# Patient Record
Sex: MALE | Race: WHITE | Employment: OTHER | URBAN - METROPOLITAN AREA
[De-identification: names, ages, dates, MRNs, and addresses within clinical notes are randomized per-mention and may not be internally consistent; named-entity substitution may affect disease eponyms.]

---

## 2019-07-24 RX ORDER — DILTIAZEM HYDROCHLORIDE 120 MG/1
120 CAPSULE, EXTENDED RELEASE ORAL DAILY
COMMUNITY

## 2019-07-24 RX ORDER — METOPROLOL SUCCINATE 100 MG/1
100 TABLET, EXTENDED RELEASE ORAL
COMMUNITY

## 2019-07-24 RX ORDER — ROSUVASTATIN CALCIUM 10 MG/1
10 TABLET, COATED ORAL DAILY
COMMUNITY

## 2019-07-24 RX ORDER — GABAPENTIN 300 MG/1
300 CAPSULE ORAL AT BEDTIME
COMMUNITY

## 2019-07-24 ASSESSMENT — MIFFLIN-ST. JEOR: SCORE: 1658.04

## 2019-07-26 NOTE — PHARMACY-ADMISSION MEDICATION HISTORY
Medication reconciliation interview completed by pre-admitting nurse, reviewed by pharmacy. No further clarifications needed.     Prior to Admission medications    Medication Sig Last Dose Taking? Auth Provider   apixaban ANTICOAGULANT (ELIQUIS) 5 MG tablet Take 5 mg by mouth 2 times daily Afib  Yes Reported, Patient   diltiazem ER (DILT-XR) 120 MG 24 hr capsule Take 120 mg by mouth daily  Yes Reported, Patient   gabapentin (NEURONTIN) 300 MG capsule Take 300 mg by mouth At Bedtime  Yes Reported, Patient   ipratropium (ATROVENT) 0.03 % nasal spray Spray 1 spray into both nostrils 3 times daily   Yes Unknown, Entered By History   Loratadine (CLARITIN PO) Take 10 mg by mouth At Bedtime   Yes Reported, Patient   metoprolol succinate ER (TOPROL-XL) 100 MG 24 hr tablet Take 100 mg by mouth 2 times daily  Yes Reported, Patient   multivitamin, therapeutic with minerals (THERA-VIT-M) TABS Take 1 tablet by mouth daily  Yes Unknown, Entered By History   psyllium 0.52 G capsule Take 8 capsules by mouth daily Dose confirmed with pt  Yes Reported, Patient   ranitidine (ZANTAC) 150 MG capsule Take 150 mg by mouth 2 times daily  Yes Reported, Patient   rosuvastatin (CRESTOR) 10 MG tablet Take 10 mg by mouth daily  Yes Reported, Patient

## 2019-07-30 ENCOUNTER — HOSPITAL ENCOUNTER (OUTPATIENT)
Dept: LAB | Facility: CLINIC | Age: 69
Discharge: HOME | DRG: 483 | End: 2019-07-30
Attending: ORTHOPAEDIC SURGERY | Admitting: ORTHOPAEDIC SURGERY
Payer: MEDICARE

## 2019-07-30 DIAGNOSIS — Z01.812 PRE-OPERATIVE LABORATORY EXAMINATION: ICD-10-CM

## 2019-07-30 LAB
ABO + RH BLD: NORMAL
ABO + RH BLD: NORMAL
BLD GP AB SCN SERPL QL: NORMAL
BLOOD BANK CMNT PATIENT-IMP: NORMAL
SPECIMEN EXP DATE BLD: NORMAL

## 2019-07-30 PROCEDURE — 86850 RBC ANTIBODY SCREEN: CPT | Performed by: ORTHOPAEDIC SURGERY

## 2019-07-30 PROCEDURE — 86901 BLOOD TYPING SEROLOGIC RH(D): CPT | Performed by: ORTHOPAEDIC SURGERY

## 2019-07-30 PROCEDURE — 86900 BLOOD TYPING SEROLOGIC ABO: CPT | Performed by: ORTHOPAEDIC SURGERY

## 2019-07-30 PROCEDURE — 36415 COLL VENOUS BLD VENIPUNCTURE: CPT | Performed by: ORTHOPAEDIC SURGERY

## 2019-07-31 ENCOUNTER — HOSPITAL ENCOUNTER (INPATIENT)
Facility: CLINIC | Age: 69
LOS: 1 days | Discharge: HOME | DRG: 483 | End: 2019-08-01
Attending: ORTHOPAEDIC SURGERY | Admitting: ORTHOPAEDIC SURGERY
Payer: MEDICARE

## 2019-07-31 ENCOUNTER — ANESTHESIA (OUTPATIENT)
Dept: SURGERY | Facility: CLINIC | Age: 69
DRG: 483 | End: 2019-07-31
Payer: MEDICARE

## 2019-07-31 ENCOUNTER — ANESTHESIA EVENT (OUTPATIENT)
Dept: SURGERY | Facility: CLINIC | Age: 69
DRG: 483 | End: 2019-07-31
Payer: MEDICARE

## 2019-07-31 DIAGNOSIS — R06.6 INTRACTABLE HICCUPS: ICD-10-CM

## 2019-07-31 DIAGNOSIS — R33.9 URINARY RETENTION WITH INCOMPLETE BLADDER EMPTYING: ICD-10-CM

## 2019-07-31 DIAGNOSIS — Z96.611 S/P REVERSE TOTAL SHOULDER ARTHROPLASTY, RIGHT: Primary | ICD-10-CM

## 2019-07-31 LAB — POTASSIUM SERPL-SCNC: 4.6 MMOL/L (ref 3.4–5.3)

## 2019-07-31 PROCEDURE — 0RRJ00Z REPLACEMENT OF RIGHT SHOULDER JOINT WITH REVERSE BALL AND SOCKET SYNTHETIC SUBSTITUTE, OPEN APPROACH: ICD-10-PCS | Performed by: ORTHOPAEDIC SURGERY

## 2019-07-31 PROCEDURE — 36000063 ZZH SURGERY LEVEL 4 EA 15 ADDTL MIN: Performed by: ORTHOPAEDIC SURGERY

## 2019-07-31 PROCEDURE — C1776 JOINT DEVICE (IMPLANTABLE): HCPCS | Performed by: ORTHOPAEDIC SURGERY

## 2019-07-31 PROCEDURE — 40000306 ZZH STATISTIC PRE PROC ASSESS II: Performed by: ORTHOPAEDIC SURGERY

## 2019-07-31 PROCEDURE — 25000125 ZZHC RX 250: Performed by: NURSE ANESTHETIST, CERTIFIED REGISTERED

## 2019-07-31 PROCEDURE — 71000012 ZZH RECOVERY PHASE 1 LEVEL 1 FIRST HR: Performed by: ORTHOPAEDIC SURGERY

## 2019-07-31 PROCEDURE — 25000132 ZZH RX MED GY IP 250 OP 250 PS 637: Performed by: PHYSICIAN ASSISTANT

## 2019-07-31 PROCEDURE — 36000093 ZZH SURGERY LEVEL 4 1ST 30 MIN: Performed by: ORTHOPAEDIC SURGERY

## 2019-07-31 PROCEDURE — 25000125 ZZHC RX 250: Performed by: ORTHOPAEDIC SURGERY

## 2019-07-31 PROCEDURE — 27210794 ZZH OR GENERAL SUPPLY STERILE: Performed by: ORTHOPAEDIC SURGERY

## 2019-07-31 PROCEDURE — 37000008 ZZH ANESTHESIA TECHNICAL FEE, 1ST 30 MIN: Performed by: ORTHOPAEDIC SURGERY

## 2019-07-31 PROCEDURE — 27110028 ZZH OR GENERAL SUPPLY NON-STERILE: Performed by: ORTHOPAEDIC SURGERY

## 2019-07-31 PROCEDURE — 25800030 ZZH RX IP 258 OP 636: Performed by: ANESTHESIOLOGY

## 2019-07-31 PROCEDURE — 25000125 ZZHC RX 250

## 2019-07-31 PROCEDURE — 25000128 H RX IP 250 OP 636: Performed by: ORTHOPAEDIC SURGERY

## 2019-07-31 PROCEDURE — 25000128 H RX IP 250 OP 636: Performed by: NURSE ANESTHETIST, CERTIFIED REGISTERED

## 2019-07-31 PROCEDURE — C1713 ANCHOR/SCREW BN/BN,TIS/BN: HCPCS | Performed by: ORTHOPAEDIC SURGERY

## 2019-07-31 PROCEDURE — 25000128 H RX IP 250 OP 636: Performed by: ANESTHESIOLOGY

## 2019-07-31 PROCEDURE — 25000125 ZZHC RX 250: Performed by: ANESTHESIOLOGY

## 2019-07-31 PROCEDURE — 25800030 ZZH RX IP 258 OP 636: Performed by: PHYSICIAN ASSISTANT

## 2019-07-31 PROCEDURE — 37000009 ZZH ANESTHESIA TECHNICAL FEE, EACH ADDTL 15 MIN: Performed by: ORTHOPAEDIC SURGERY

## 2019-07-31 PROCEDURE — 84132 ASSAY OF SERUM POTASSIUM: CPT | Performed by: ANESTHESIOLOGY

## 2019-07-31 PROCEDURE — 25800030 ZZH RX IP 258 OP 636: Performed by: NURSE ANESTHETIST, CERTIFIED REGISTERED

## 2019-07-31 PROCEDURE — 36415 COLL VENOUS BLD VENIPUNCTURE: CPT | Performed by: ANESTHESIOLOGY

## 2019-07-31 PROCEDURE — 27211024 ZZHC OR SUPPLY OTHER OPNP: Performed by: ORTHOPAEDIC SURGERY

## 2019-07-31 PROCEDURE — 25800025 ZZH RX 258: Performed by: ORTHOPAEDIC SURGERY

## 2019-07-31 DEVICE — IMPLANTABLE DEVICE: Type: IMPLANTABLE DEVICE | Site: SHOULDER | Status: FUNCTIONAL

## 2019-07-31 RX ORDER — ONDANSETRON 4 MG/1
4 TABLET, ORALLY DISINTEGRATING ORAL EVERY 30 MIN PRN
Status: DISCONTINUED | OUTPATIENT
Start: 2019-07-31 | End: 2019-07-31 | Stop reason: HOSPADM

## 2019-07-31 RX ORDER — LIDOCAINE 40 MG/G
CREAM TOPICAL
Status: DISCONTINUED | OUTPATIENT
Start: 2019-07-31 | End: 2019-07-31 | Stop reason: HOSPADM

## 2019-07-31 RX ORDER — ROPIVACAINE HYDROCHLORIDE 7.5 MG/ML
INJECTION, SOLUTION EPIDURAL; PERINEURAL PRN
Status: DISCONTINUED | OUTPATIENT
Start: 2019-07-31 | End: 2019-07-31

## 2019-07-31 RX ORDER — GLYCOPYRROLATE 0.2 MG/ML
INJECTION, SOLUTION INTRAMUSCULAR; INTRAVENOUS PRN
Status: DISCONTINUED | OUTPATIENT
Start: 2019-07-31 | End: 2019-07-31

## 2019-07-31 RX ORDER — FENTANYL CITRATE 50 UG/ML
25-50 INJECTION, SOLUTION INTRAMUSCULAR; INTRAVENOUS
Status: DISCONTINUED | OUTPATIENT
Start: 2019-07-31 | End: 2019-07-31 | Stop reason: HOSPADM

## 2019-07-31 RX ORDER — PROPOFOL 10 MG/ML
INJECTION, EMULSION INTRAVENOUS PRN
Status: DISCONTINUED | OUTPATIENT
Start: 2019-07-31 | End: 2019-07-31

## 2019-07-31 RX ORDER — AMOXICILLIN 250 MG
1-2 CAPSULE ORAL 2 TIMES DAILY
Qty: 50 TABLET | Refills: 0 | Status: SHIPPED | OUTPATIENT
Start: 2019-07-31

## 2019-07-31 RX ORDER — NALOXONE HYDROCHLORIDE 0.4 MG/ML
.1-.4 INJECTION, SOLUTION INTRAMUSCULAR; INTRAVENOUS; SUBCUTANEOUS
Status: DISCONTINUED | OUTPATIENT
Start: 2019-07-31 | End: 2019-08-01 | Stop reason: HOSPADM

## 2019-07-31 RX ORDER — ONDANSETRON 4 MG/1
4-8 TABLET, ORALLY DISINTEGRATING ORAL EVERY 8 HOURS PRN
Qty: 4 TABLET | Refills: 0 | Status: SHIPPED | OUTPATIENT
Start: 2019-07-31

## 2019-07-31 RX ORDER — LABETALOL 20 MG/4 ML (5 MG/ML) INTRAVENOUS SYRINGE
10
Status: DISCONTINUED | OUTPATIENT
Start: 2019-07-31 | End: 2019-07-31 | Stop reason: HOSPADM

## 2019-07-31 RX ORDER — IPRATROPIUM BROMIDE 21 UG/1
1 SPRAY, METERED NASAL 3 TIMES DAILY
Status: DISCONTINUED | OUTPATIENT
Start: 2019-07-31 | End: 2019-08-01 | Stop reason: HOSPADM

## 2019-07-31 RX ORDER — ONDANSETRON 2 MG/ML
INJECTION INTRAMUSCULAR; INTRAVENOUS PRN
Status: DISCONTINUED | OUTPATIENT
Start: 2019-07-31 | End: 2019-07-31

## 2019-07-31 RX ORDER — ONDANSETRON 2 MG/ML
4 INJECTION INTRAMUSCULAR; INTRAVENOUS EVERY 30 MIN PRN
Status: DISCONTINUED | OUTPATIENT
Start: 2019-07-31 | End: 2019-07-31 | Stop reason: HOSPADM

## 2019-07-31 RX ORDER — METOPROLOL SUCCINATE 100 MG/1
100 TABLET, EXTENDED RELEASE ORAL
Status: DISCONTINUED | OUTPATIENT
Start: 2019-07-31 | End: 2019-08-01 | Stop reason: HOSPADM

## 2019-07-31 RX ORDER — DILTIAZEM HYDROCHLORIDE 120 MG/1
120 CAPSULE, COATED, EXTENDED RELEASE ORAL DAILY
Status: DISCONTINUED | OUTPATIENT
Start: 2019-08-01 | End: 2019-08-01 | Stop reason: HOSPADM

## 2019-07-31 RX ORDER — LIDOCAINE HCL/EPINEPHRINE/PF 2%-1:200K
VIAL (ML) INJECTION PRN
Status: DISCONTINUED | OUTPATIENT
Start: 2019-07-31 | End: 2019-07-31

## 2019-07-31 RX ORDER — DEXAMETHASONE SODIUM PHOSPHATE 4 MG/ML
INJECTION, SOLUTION INTRA-ARTICULAR; INTRALESIONAL; INTRAMUSCULAR; INTRAVENOUS; SOFT TISSUE PRN
Status: DISCONTINUED | OUTPATIENT
Start: 2019-07-31 | End: 2019-07-31

## 2019-07-31 RX ORDER — SODIUM CHLORIDE, SODIUM LACTATE, POTASSIUM CHLORIDE, CALCIUM CHLORIDE 600; 310; 30; 20 MG/100ML; MG/100ML; MG/100ML; MG/100ML
INJECTION, SOLUTION INTRAVENOUS CONTINUOUS
Status: DISCONTINUED | OUTPATIENT
Start: 2019-07-31 | End: 2019-07-31 | Stop reason: HOSPADM

## 2019-07-31 RX ORDER — NALOXONE HYDROCHLORIDE 0.4 MG/ML
.1-.4 INJECTION, SOLUTION INTRAMUSCULAR; INTRAVENOUS; SUBCUTANEOUS
Status: DISCONTINUED | OUTPATIENT
Start: 2019-07-31 | End: 2019-07-31

## 2019-07-31 RX ORDER — EPHEDRINE SULFATE 50 MG/ML
INJECTION, SOLUTION INTRAMUSCULAR; INTRAVENOUS; SUBCUTANEOUS PRN
Status: DISCONTINUED | OUTPATIENT
Start: 2019-07-31 | End: 2019-07-31

## 2019-07-31 RX ORDER — HYDROMORPHONE HYDROCHLORIDE 1 MG/ML
.3-.5 INJECTION, SOLUTION INTRAMUSCULAR; INTRAVENOUS; SUBCUTANEOUS EVERY 5 MIN PRN
Status: DISCONTINUED | OUTPATIENT
Start: 2019-07-31 | End: 2019-07-31 | Stop reason: HOSPADM

## 2019-07-31 RX ORDER — LIDOCAINE HYDROCHLORIDE 10 MG/ML
INJECTION, SOLUTION INFILTRATION; PERINEURAL PRN
Status: DISCONTINUED | OUTPATIENT
Start: 2019-07-31 | End: 2019-07-31

## 2019-07-31 RX ORDER — LIDOCAINE 40 MG/G
CREAM TOPICAL
Status: DISCONTINUED | OUTPATIENT
Start: 2019-07-31 | End: 2019-08-01 | Stop reason: HOSPADM

## 2019-07-31 RX ORDER — LIDOCAINE HYDROCHLORIDE ANHYDROUS AND EPINEPHRINE 10; 10 MG/ML; UG/ML
30 INJECTION, SOLUTION INFILTRATION ONCE
Status: DISCONTINUED | OUTPATIENT
Start: 2019-07-31 | End: 2019-08-01 | Stop reason: HOSPADM

## 2019-07-31 RX ORDER — OXYCODONE HYDROCHLORIDE 5 MG/1
5-10 TABLET ORAL
Status: DISCONTINUED | OUTPATIENT
Start: 2019-07-31 | End: 2019-08-01 | Stop reason: HOSPADM

## 2019-07-31 RX ORDER — SODIUM CHLORIDE, SODIUM LACTATE, POTASSIUM CHLORIDE, CALCIUM CHLORIDE 600; 310; 30; 20 MG/100ML; MG/100ML; MG/100ML; MG/100ML
INJECTION, SOLUTION INTRAVENOUS CONTINUOUS
Status: DISCONTINUED | OUTPATIENT
Start: 2019-07-31 | End: 2019-08-01 | Stop reason: HOSPADM

## 2019-07-31 RX ORDER — ONDANSETRON 2 MG/ML
4 INJECTION INTRAMUSCULAR; INTRAVENOUS EVERY 6 HOURS PRN
Status: DISCONTINUED | OUTPATIENT
Start: 2019-07-31 | End: 2019-08-01 | Stop reason: HOSPADM

## 2019-07-31 RX ORDER — LORATADINE 10 MG/1
10 TABLET ORAL AT BEDTIME
Status: DISCONTINUED | OUTPATIENT
Start: 2019-07-31 | End: 2019-08-01 | Stop reason: HOSPADM

## 2019-07-31 RX ORDER — ROSUVASTATIN CALCIUM 5 MG/1
10 TABLET, COATED ORAL EVERY EVENING
Status: DISCONTINUED | OUTPATIENT
Start: 2019-07-31 | End: 2019-08-01 | Stop reason: HOSPADM

## 2019-07-31 RX ORDER — MULTIPLE VITAMINS W/ MINERALS TAB 9MG-400MCG
1 TAB ORAL DAILY
Status: DISCONTINUED | OUTPATIENT
Start: 2019-08-01 | End: 2019-08-01 | Stop reason: HOSPADM

## 2019-07-31 RX ORDER — LIDOCAINE HYDROCHLORIDE 20 MG/ML
JELLY TOPICAL
Status: COMPLETED
Start: 2019-07-31 | End: 2019-07-31

## 2019-07-31 RX ORDER — HYDROXYZINE HYDROCHLORIDE 25 MG/1
25 TABLET, FILM COATED ORAL EVERY 6 HOURS PRN
Status: DISCONTINUED | OUTPATIENT
Start: 2019-07-31 | End: 2019-08-01 | Stop reason: HOSPADM

## 2019-07-31 RX ORDER — ACETAMINOPHEN 325 MG/1
650 TABLET ORAL EVERY 4 HOURS PRN
Qty: 60 TABLET | Refills: 0 | Status: SHIPPED | OUTPATIENT
Start: 2019-07-31

## 2019-07-31 RX ORDER — FENTANYL CITRATE 50 UG/ML
INJECTION, SOLUTION INTRAMUSCULAR; INTRAVENOUS PRN
Status: DISCONTINUED | OUTPATIENT
Start: 2019-07-31 | End: 2019-07-31

## 2019-07-31 RX ORDER — CEFAZOLIN SODIUM 1 G/3ML
1 INJECTION, POWDER, FOR SOLUTION INTRAMUSCULAR; INTRAVENOUS SEE ADMIN INSTRUCTIONS
Status: DISCONTINUED | OUTPATIENT
Start: 2019-07-31 | End: 2019-07-31 | Stop reason: HOSPADM

## 2019-07-31 RX ORDER — PROPOFOL 10 MG/ML
INJECTION, EMULSION INTRAVENOUS CONTINUOUS PRN
Status: DISCONTINUED | OUTPATIENT
Start: 2019-07-31 | End: 2019-07-31

## 2019-07-31 RX ORDER — OXYCODONE HYDROCHLORIDE 5 MG/1
5-10 TABLET ORAL
Qty: 50 TABLET | Refills: 0 | Status: SHIPPED | OUTPATIENT
Start: 2019-07-31

## 2019-07-31 RX ORDER — CEFAZOLIN SODIUM 2 G/100ML
2 INJECTION, SOLUTION INTRAVENOUS
Status: COMPLETED | OUTPATIENT
Start: 2019-07-31 | End: 2019-07-31

## 2019-07-31 RX ORDER — HYDROMORPHONE HYDROCHLORIDE 1 MG/ML
0.2 INJECTION, SOLUTION INTRAMUSCULAR; INTRAVENOUS; SUBCUTANEOUS
Status: DISCONTINUED | OUTPATIENT
Start: 2019-07-31 | End: 2019-08-01 | Stop reason: HOSPADM

## 2019-07-31 RX ORDER — ONDANSETRON 4 MG/1
4 TABLET, ORALLY DISINTEGRATING ORAL EVERY 6 HOURS PRN
Status: DISCONTINUED | OUTPATIENT
Start: 2019-07-31 | End: 2019-08-01 | Stop reason: HOSPADM

## 2019-07-31 RX ORDER — IBUPROFEN 600 MG/1
600 TABLET, FILM COATED ORAL EVERY 6 HOURS PRN
Qty: 30 TABLET | Refills: 0 | Status: SHIPPED | OUTPATIENT
Start: 2019-07-31

## 2019-07-31 RX ORDER — GABAPENTIN 300 MG/1
300 CAPSULE ORAL AT BEDTIME
Status: DISCONTINUED | OUTPATIENT
Start: 2019-07-31 | End: 2019-08-01 | Stop reason: HOSPADM

## 2019-07-31 RX ADMIN — IPRATROPIUM BROMIDE 1 SPRAY: 21 SPRAY NASAL at 22:40

## 2019-07-31 RX ADMIN — LIDOCAINE HYDROCHLORIDE: 20 JELLY TOPICAL at 22:40

## 2019-07-31 RX ADMIN — PROPOFOL 150 MG: 10 INJECTION, EMULSION INTRAVENOUS at 14:13

## 2019-07-31 RX ADMIN — Medication 1 G: at 14:20

## 2019-07-31 RX ADMIN — MIDAZOLAM 1 MG: 1 INJECTION INTRAMUSCULAR; INTRAVENOUS at 16:14

## 2019-07-31 RX ADMIN — MIDAZOLAM 1 MG: 1 INJECTION INTRAMUSCULAR; INTRAVENOUS at 14:04

## 2019-07-31 RX ADMIN — SODIUM CHLORIDE, POTASSIUM CHLORIDE, SODIUM LACTATE AND CALCIUM CHLORIDE: 600; 310; 30; 20 INJECTION, SOLUTION INTRAVENOUS at 15:43

## 2019-07-31 RX ADMIN — Medication 10 MG: at 14:27

## 2019-07-31 RX ADMIN — PROPOFOL 50 MCG/KG/MIN: 10 INJECTION, EMULSION INTRAVENOUS at 14:24

## 2019-07-31 RX ADMIN — LIDOCAINE HYDROCHLORIDE 30 MG: 10 INJECTION, SOLUTION INFILTRATION; PERINEURAL at 14:13

## 2019-07-31 RX ADMIN — GABAPENTIN 300 MG: 300 CAPSULE ORAL at 22:39

## 2019-07-31 RX ADMIN — RANITIDINE 150 MG: 150 TABLET ORAL at 20:27

## 2019-07-31 RX ADMIN — LORATADINE 10 MG: 10 TABLET ORAL at 22:39

## 2019-07-31 RX ADMIN — SODIUM CHLORIDE, POTASSIUM CHLORIDE, SODIUM LACTATE AND CALCIUM CHLORIDE: 600; 310; 30; 20 INJECTION, SOLUTION INTRAVENOUS at 12:52

## 2019-07-31 RX ADMIN — LIDOCAINE HYDROCHLORIDE,EPINEPHRINE BITARTRATE 6 ML: 20; .005 INJECTION, SOLUTION EPIDURAL; INFILTRATION; INTRACAUDAL; PERINEURAL at 13:18

## 2019-07-31 RX ADMIN — DEXAMETHASONE SODIUM PHOSPHATE 4 MG: 4 INJECTION, SOLUTION INTRA-ARTICULAR; INTRALESIONAL; INTRAMUSCULAR; INTRAVENOUS; SOFT TISSUE at 14:13

## 2019-07-31 RX ADMIN — ROSUVASTATIN CALCIUM 10 MG: 5 TABLET, FILM COATED ORAL at 22:39

## 2019-07-31 RX ADMIN — ROCURONIUM BROMIDE 40 MG: 10 INJECTION INTRAVENOUS at 14:13

## 2019-07-31 RX ADMIN — ONDANSETRON 4 MG: 2 INJECTION INTRAMUSCULAR; INTRAVENOUS at 15:41

## 2019-07-31 RX ADMIN — FENTANYL CITRATE 150 MCG: 50 INJECTION, SOLUTION INTRAMUSCULAR; INTRAVENOUS at 14:13

## 2019-07-31 RX ADMIN — GLYCOPYRROLATE 0.2 MG: 0.2 INJECTION, SOLUTION INTRAMUSCULAR; INTRAVENOUS at 14:13

## 2019-07-31 RX ADMIN — PHENYLEPHRINE HYDROCHLORIDE 100 MCG: 10 INJECTION INTRAVENOUS at 14:35

## 2019-07-31 RX ADMIN — CEFAZOLIN SODIUM 2 G: 2 INJECTION, SOLUTION INTRAVENOUS at 14:17

## 2019-07-31 RX ADMIN — Medication 1 G: at 16:35

## 2019-07-31 RX ADMIN — Medication 10 MG: at 14:46

## 2019-07-31 RX ADMIN — ROPIVACAINE HYDROCHLORIDE 14 ML: 7.5 INJECTION, SOLUTION EPIDURAL; PERINEURAL at 13:18

## 2019-07-31 RX ADMIN — FENTANYL CITRATE 50 MCG: 50 INJECTION, SOLUTION INTRAMUSCULAR; INTRAVENOUS at 16:14

## 2019-07-31 RX ADMIN — PROPOFOL 30 MG: 10 INJECTION, EMULSION INTRAVENOUS at 16:14

## 2019-07-31 RX ADMIN — SODIUM CHLORIDE, POTASSIUM CHLORIDE, SODIUM LACTATE AND CALCIUM CHLORIDE: 600; 310; 30; 20 INJECTION, SOLUTION INTRAVENOUS at 18:37

## 2019-07-31 RX ADMIN — CEFAZOLIN 1 G: 1 INJECTION, POWDER, FOR SOLUTION INTRAMUSCULAR; INTRAVENOUS at 16:14

## 2019-07-31 ASSESSMENT — ENCOUNTER SYMPTOMS
SEIZURES: 0
DYSRHYTHMIAS: 1

## 2019-07-31 ASSESSMENT — MIFFLIN-ST. JEOR: SCORE: 1635.36

## 2019-07-31 NOTE — ANESTHESIA POSTPROCEDURE EVALUATION
Patient: Tanner Pickett    Procedure(s):  Right reverse total shoulder arthroplasty    Diagnosis:right shoulder end stage osteoarthritis  Diagnosis Additional Information: No value filed.    Anesthesia Type:  General, ETT, Periph. Nerve Block for postop pain    Note:  Anesthesia Post Evaluation    Patient location during evaluation: PACU  Patient participation: Able to participate in evaluation but full recovery from regional anesthesia has not yet ocurrred but is anticipated to occur within 48 hours  Level of consciousness: awake  Pain management: adequate  Airway patency: patent  Cardiovascular status: acceptable  Respiratory status: acceptable  Hydration status: acceptable  PONV: controlled     Anesthetic complications: None          Last vitals:  Vitals:    07/31/19 1700 07/31/19 1705 07/31/19 1710   BP: 123/61 127/75    Pulse: 53 56    Resp: 8 16 12   Temp:      SpO2: 100% 100% 100%         Electronically Signed By: Erasto Oliva MD  July 31, 2019  5:18 PM

## 2019-07-31 NOTE — ANESTHESIA CARE TRANSFER NOTE
Patient: Tanner Pickett    Procedure(s):  Right reverse total shoulder arthroplasty    Diagnosis: right shoulder end stage osteoarthritis  Diagnosis Additional Information: No value filed.    Anesthesia Type:   General, ETT, Periph. Nerve Block for postop pain     Note:  Airway :Face Mask  Patient transferred to:PACU  Handoff Report: Identifed the Patient, Identified the Reponsible Provider, Reviewed the pertinent medical history, Discussed the surgical course, Reviewed Intra-OP anesthesia mangement and issues during anesthesia, Set expectations for post-procedure period and Allowed opportunity for questions and acknowledgement of understanding      Vitals: (Last set prior to Anesthesia Care Transfer)    CRNA VITALS  7/31/2019 1608 - 7/31/2019 1647      7/31/2019             NIBP:  107/49    NIBP Mean:  73                Electronically Signed By: INOCENCIA Blevins CRNA  July 31, 2019  4:47 PM

## 2019-07-31 NOTE — ANESTHESIA PROCEDURE NOTES
Peripheral nerve/Neuraxial procedure note : Brachial plexus  Pre-Procedure  Performed by Allan Pimentle MD  Referred by ROMEO  Location: pre-op      Pre-Anesthestic Checklist: patient identified, IV checked, site marked, risks and benefits discussed, informed consent, monitors and equipment checked, pre-op evaluation, at physician/surgeon's request and post-op pain management    Timeout  Correct Patient: Yes   Correct Procedure: Yes   Correct Site: Yes   Correct Laterality: Yes   Correct Position: Yes   Site Marked: Yes   .   Procedure Documentation    .    Procedure:  right  Brachial plexus.     Ultrasound used to identify targeted nerve, plexus, or vascular marker and placed a needle adjacent to it., Ultrasound was used to visualize the spread of the anesthetic in close proximity to the above stated nerve.   Patient Prep;mask, sterile gloves, povidone-iodine 7.5% surgical scrub.  Nerve Stim: Initial Level 0.7 mA. Lowest motor response mA..  Needle: insulated, short bevel Needle Gauge: 22.    Needle Length (Inches) 4  Insertion Method: Single Shot.       Assessment/Narrative  Paresthesias: No.  Injection made incrementally with aspirations every 5 mL..  The placement was negative for: blood aspirated, painful injection and site bleeding.  Bolus given via needle..   Secured via.   Complications: none. Test dose of mL at. Test dose negative for signs of intravascular, subdural or intrathecal injection. Comments:  The surgeon has given a verbal order transferring care of this patient to me for the performance of a regional analgesia block for post-op pain control. It is requested of me because I am uniquely trained and qualified to perform this block and the surgeon is neither trained nor qualified to perform this procedure.

## 2019-07-31 NOTE — BRIEF OP NOTE
[facility]    Brief Operative Note    Pre-operative diagnosis: right shoulder end stage osteoarthritis  Post-operative diagnosis same  Procedure: Procedure(s):  Right reverse total shoulder arthroplasty  Surgeon: Surgeon(s) and Role:     * Per Yepez MD - Primary  Anesthesia: Other   Estimated blood loss: * No values recorded between 7/31/2019  2:30 PM and 7/31/2019  4:35 PM *  Drains: None  Specimens: * No specimens in log *  Findings:   None.  Complications: None.  Implants:    Implant Name Type Inv. Item Serial No.  Lot No. LRB No. Used   large augmented baseplatewith mini taper adapter porous plasma uncemented    [employer] [id number] Right 1   central screw 6.5mm 35mm length    [employer] [id number] Right 1   fixed locking screw 4.75 35mm length    [employer] [id number] Right 1   fixed locking screw 4.75mm 15mm length    [employer] [id number] Right 1   fixed locking screw 4.75mm 20mm length    [employer] [id number] Right 1   fixed locking screw 4.75mm 20mm length    [employer] [id number] Right 1   glenosphere standard offset 40mm diameter  devin: [id number]    BIOMET [id number] Right 1   mini humeral stem 10mm 83mm long    BIOMET [id number] Right 1   mini humeral tray standard thickness +3mm taper offset  40mm  diameter    [employer]ET [id number] Right 1   highly crosslinked  polyethylene bearing standard 40mm diameter    [employer] [id number] Right 1   augmented reamer guide screw    [employer] [id number] Right 1      -Sling all times besides hygiene  F/U in clinic in 2 weeks

## 2019-07-31 NOTE — ANESTHESIA PREPROCEDURE EVALUATION
Anesthesia Pre-Procedure Evaluation    Patient: Tanner Pickett   MRN: [id number] : 1950          Preoperative Diagnosis: right shoulder end stage osteoarthritis    Procedure(s):  Right reverse total shoulder arthroplasty (choice anesthesia)    Past Medical History:   Diagnosis Date     Arrhythmia     Hx of Afib     Clavicle fracture      Constipation      High cholesterol      History of blood transfusion      Hypertension      Multiple rib fractures      Osteoarthritis     generalized     Pneumothorax      Shoulder fracture      Past Surgical History:   Procedure Laterality Date     ESOPHAGOSCOPY, GASTROSCOPY, DUODENOSCOPY (EGD), COMBINED  2015    Dr. Delarosa [facility]     ESOPHAGOSCOPY, GASTROSCOPY, DUODENOSCOPY (EGD), COMBINED N/A 2015    Procedure: COMBINED ESOPHAGOSCOPY, GASTROSCOPY, DUODENOSCOPY (EGD);  Surgeon: Jennifer Delarosa MD;  Location:  GI     ESOPHAGOSCOPY, GASTROSCOPY, DUODENOSCOPY (EGD), COMBINED N/A 2016    Procedure: COMBINED ESOPHAGOSCOPY, GASTROSCOPY, DUODENOSCOPY (EGD);  Surgeon: Loida Portillo MD;  Location:  GI     HEAD & NECK SURGERY      wisdom teeth     ORTHOPEDIC SURGERY      right shoulder surgery age 25 yrs.     ORTHOPEDIC SURGERY Left 2019    great toe surgery for arthritis     Anesthesia Evaluation     .             ROS/MED HX    ENT/Pulmonary:      (-) asthma, sleep apnea and Other pulmonary disease   Neurologic:      (-) seizures, CVA, TIA, Other neuro hx, Dementia, Neuropathy and migraines   Cardiovascular:     (+) Dyslipidemia, hypertension----. : . . . :. dysrhythmias a-fib, .      (-) CAD, CHF and pulmonary hypertension   METS/Exercise Tolerance:     Hematologic:     (+) History of Transfusion -     (-) anemia   Musculoskeletal:   (+) arthritis,  -       GI/Hepatic:        (-) GERD and hepatitis   Renal/Genitourinary:      (-) renal disease   Endo:      (-) Type I DM, Type II DM, thyroid disease, chronic steroid usage, other endocrine  disorder and obesity   Psychiatric:        (-) psychiatric history   Infectious Disease:  - neg infectious disease ROS       Malignancy:      - no malignancy   Other:                          Physical Exam      Airway   Mallampati: II  TM distance: >3 FB  Neck ROM: full    Dental     Cardiovascular   Rhythm and rate: regular and normal  (-) no murmur    Pulmonary    breath sounds clear to auscultation    Other findings: Lab Test        02/03/16 01/23/16 01/22/16      --          01/22/16      --          01/21/16                       1933          0621          1807           --           0450           --           2023          WBC          4.7           --           --           --          6.1           --          6.1           HGB          9.7*         9.2*         9.4*           < >        9.0*           < >        7.8*          MCV          85            --           --           --          84            --          86            PLT          381           --           --           --          277           --          346           INR           --           --           --           --           --           --          1.08           < > = values in this interval not displayed.                  Lab Test        07/31/19 01/22/16 01/21/16 01/21/16 01/02/16                       1156          0450          2030          2023          0700          NA            --          140          135          136          140           POTASSIUM    4.6          4.1          3.6          3.6          4.2           CHLORIDE      --          110*          --          105          109           CO2           --          22            --          21           25            BUN           --          18            --          18           9             CR            --          0.86          --          1.03         1.01          ANIONGAP      --          8             --          10           6       "       AGUSTO           --          7.6*          --          7.3*         8.2*          GLC           --          117*          --          122*         106*                Lab Results   Component Value Date    WBC 4.7 02/03/2016    HGB 9.7 (L) 02/03/2016    HCT 28.7 (L) 02/03/2016     02/03/2016    .1 (H) 03/01/2014    SED 88 (H) 03/01/2014     01/22/2016    POTASSIUM 4.6 07/31/2019    CHLORIDE 110 (H) 01/22/2016    CO2 22 01/22/2016    BUN 18 01/22/2016    CR 0.86 01/22/2016     (H) 01/22/2016    AGUSTO 7.6 (L) 01/22/2016    MAG 2.0 01/01/2016    ALBUMIN 2.9 (L) 01/21/2016    PROTTOTAL 6.1 (L) 01/21/2016    ALT 27 01/21/2016    AST 19 01/21/2016    ALKPHOS 74 01/21/2016    BILITOTAL 0.3 01/21/2016    LIPASE 256 12/31/2015    PTT <20  Results confirmed by repeat test   (L) 01/21/2016    INR 1.08 01/21/2016    TSH 1.72 02/03/2016       Preop Vitals  BP Readings from Last 3 Encounters:   07/31/19 (!) 160/80   02/03/16 162/86   01/23/16 141/81    Pulse Readings from Last 3 Encounters:   02/03/16 84   01/01/16 80   03/01/14 79      Resp Readings from Last 3 Encounters:   02/03/16 16   01/23/16 16   01/02/16 16    SpO2 Readings from Last 3 Encounters:   07/31/19 100%   02/03/16 99%   01/23/16 99%      Temp Readings from Last 1 Encounters:   07/31/19 98  F (36.7  C) (Temporal)    Ht Readings from Last 1 Encounters:   07/31/19 1.803 m (5' 11\")      Wt Readings from Last 1 Encounters:   07/31/19 84.8 kg (187 lb)    Estimated body mass index is 26.08 kg/m  as calculated from the following:    Height as of this encounter: 1.803 m (5' 11\").    Weight as of this encounter: 84.8 kg (187 lb).       Anesthesia Plan      History & Physical Review  History and physical reviewed and following examination; no interval change.    ASA Status:  3 .    NPO Status:  > 8 hours    Plan for General, ETT and Periph. Nerve Block for postop pain with Propofol induction. Maintenance will be Balanced.    PONV prophylaxis:  " Ondansetron (or other 5HT-3) and Dexamethasone or Solumedrol       Postoperative Care  Postoperative pain management:  IV analgesics, Oral pain medications and Peripheral nerve block (Single Shot).      Consents                 Allan Pimentel MD                    .

## 2019-07-31 NOTE — PROVIDER NOTIFICATION
While admitting patient, he complained that his right underarm was burning.  Shavon the NST asked me to look at it.  On observation it appears there is a large area of redness that is slightly raised in areas.  It encompasses the entire axilla and surrounding skin.  The patient states he noticed it after his showers and feels that he may not have rinsed the soap off completely.  I told Dr. Pimentel about it and had him look at it.  I will also inform Dr. Yepez.

## 2019-08-01 ENCOUNTER — APPOINTMENT (OUTPATIENT)
Dept: GENERAL RADIOLOGY | Facility: CLINIC | Age: 69
DRG: 483 | End: 2019-08-01
Attending: PHYSICIAN ASSISTANT
Payer: MEDICARE

## 2019-08-01 ENCOUNTER — APPOINTMENT (OUTPATIENT)
Dept: OCCUPATIONAL THERAPY | Facility: CLINIC | Age: 69
DRG: 483 | End: 2019-08-01
Attending: ORTHOPAEDIC SURGERY
Payer: MEDICARE

## 2019-08-01 VITALS
SYSTOLIC BLOOD PRESSURE: 144 MMHG | WEIGHT: 187 LBS | TEMPERATURE: 97.9 F | DIASTOLIC BLOOD PRESSURE: 65 MMHG | HEIGHT: 71 IN | BODY MASS INDEX: 26.18 KG/M2 | HEART RATE: 47 BPM | OXYGEN SATURATION: 100 % | RESPIRATION RATE: 16 BRPM

## 2019-08-01 LAB — GLUCOSE BLDC GLUCOMTR-MCNC: 156 MG/DL (ref 70–99)

## 2019-08-01 PROCEDURE — 97535 SELF CARE MNGMENT TRAINING: CPT | Mod: GO | Performed by: STUDENT IN AN ORGANIZED HEALTH CARE EDUCATION/TRAINING PROGRAM

## 2019-08-01 PROCEDURE — 73030 X-RAY EXAM OF SHOULDER: CPT | Mod: RT

## 2019-08-01 PROCEDURE — 12000000 ZZH R&B MED SURG/OB

## 2019-08-01 PROCEDURE — 97165 OT EVAL LOW COMPLEX 30 MIN: CPT | Mod: GO | Performed by: STUDENT IN AN ORGANIZED HEALTH CARE EDUCATION/TRAINING PROGRAM

## 2019-08-01 PROCEDURE — 25000125 ZZHC RX 250: Performed by: ORTHOPAEDIC SURGERY

## 2019-08-01 PROCEDURE — 00000146 ZZHCL STATISTIC GLUCOSE BY METER IP

## 2019-08-01 PROCEDURE — 25000132 ZZH RX MED GY IP 250 OP 250 PS 637: Performed by: PHYSICIAN ASSISTANT

## 2019-08-01 PROCEDURE — 97530 THERAPEUTIC ACTIVITIES: CPT | Mod: GO | Performed by: STUDENT IN AN ORGANIZED HEALTH CARE EDUCATION/TRAINING PROGRAM

## 2019-08-01 PROCEDURE — 25000132 ZZH RX MED GY IP 250 OP 250 PS 637: Performed by: INTERNAL MEDICINE

## 2019-08-01 PROCEDURE — 99221 1ST HOSP IP/OBS SF/LOW 40: CPT | Performed by: INTERNAL MEDICINE

## 2019-08-01 PROCEDURE — 97110 THERAPEUTIC EXERCISES: CPT | Mod: GO | Performed by: STUDENT IN AN ORGANIZED HEALTH CARE EDUCATION/TRAINING PROGRAM

## 2019-08-01 PROCEDURE — 25000128 H RX IP 250 OP 636: Performed by: ORTHOPAEDIC SURGERY

## 2019-08-01 RX ORDER — CHLORPROMAZINE HYDROCHLORIDE 10 MG/1
10 TABLET, FILM COATED ORAL 3 TIMES DAILY
Qty: 3 TABLET | Refills: 0 | Status: SHIPPED | OUTPATIENT
Start: 2019-08-01

## 2019-08-01 RX ORDER — TAMSULOSIN HYDROCHLORIDE 0.4 MG/1
0.4 CAPSULE ORAL DAILY
Qty: 30 CAPSULE | Refills: 0 | Status: SHIPPED | OUTPATIENT
Start: 2019-08-01

## 2019-08-01 RX ORDER — CHLORPROMAZINE HYDROCHLORIDE 10 MG/1
10 TABLET, FILM COATED ORAL 3 TIMES DAILY
Status: DISCONTINUED | OUTPATIENT
Start: 2019-08-01 | End: 2019-08-01 | Stop reason: HOSPADM

## 2019-08-01 RX ORDER — HYDROXYZINE HYDROCHLORIDE 25 MG/1
25 TABLET, FILM COATED ORAL 3 TIMES DAILY PRN
Qty: 50 TABLET | Refills: 0 | Status: SHIPPED | OUTPATIENT
Start: 2019-08-01

## 2019-08-01 RX ORDER — METOCLOPRAMIDE 5 MG/1
10 TABLET ORAL
Status: DISCONTINUED | OUTPATIENT
Start: 2019-08-01 | End: 2019-08-01

## 2019-08-01 RX ORDER — LIDOCAINE HYDROCHLORIDE 20 MG/ML
10 JELLY TOPICAL ONCE
Status: DISCONTINUED | OUTPATIENT
Start: 2019-08-01 | End: 2019-08-01 | Stop reason: HOSPADM

## 2019-08-01 RX ORDER — TAMSULOSIN HYDROCHLORIDE 0.4 MG/1
0.4 CAPSULE ORAL DAILY
Qty: 30 CAPSULE | Refills: 0 | Status: SHIPPED | OUTPATIENT
Start: 2019-08-01 | End: 2019-08-01

## 2019-08-01 RX ORDER — TAMSULOSIN HYDROCHLORIDE 0.4 MG/1
0.4 CAPSULE ORAL DAILY
Status: DISCONTINUED | OUTPATIENT
Start: 2019-08-01 | End: 2019-08-01 | Stop reason: HOSPADM

## 2019-08-01 RX ADMIN — RANITIDINE 150 MG: 150 TABLET ORAL at 11:58

## 2019-08-01 RX ADMIN — APIXABAN 5 MG: 5 TABLET, FILM COATED ORAL at 11:58

## 2019-08-01 RX ADMIN — Medication 8 CAPSULE: at 11:56

## 2019-08-01 RX ADMIN — CHLORPROMAZINE HYDROCHLORIDE 10 MG: 10 TABLET, SUGAR COATED ORAL at 11:59

## 2019-08-01 RX ADMIN — DILTIAZEM HYDROCHLORIDE 120 MG: 120 CAPSULE, COATED, EXTENDED RELEASE ORAL at 11:58

## 2019-08-01 RX ADMIN — MULTIPLE VITAMINS W/ MINERALS TAB 1 TABLET: TAB at 11:58

## 2019-08-01 RX ADMIN — OXYCODONE HYDROCHLORIDE 5 MG: 5 TABLET ORAL at 12:45

## 2019-08-01 RX ADMIN — HYDROXYZINE HYDROCHLORIDE 25 MG: 25 TABLET ORAL at 05:37

## 2019-08-01 RX ADMIN — IPRATROPIUM BROMIDE 1 SPRAY: 21 SPRAY NASAL at 12:01

## 2019-08-01 RX ADMIN — METOPROLOL SUCCINATE 100 MG: 100 TABLET, EXTENDED RELEASE ORAL at 11:56

## 2019-08-01 RX ADMIN — OXYCODONE HYDROCHLORIDE 5 MG: 5 TABLET ORAL at 04:22

## 2019-08-01 RX ADMIN — OXYCODONE HYDROCHLORIDE 5 MG: 5 TABLET ORAL at 05:37

## 2019-08-01 RX ADMIN — TAMSULOSIN HYDROCHLORIDE 0.4 MG: 0.4 CAPSULE ORAL at 12:45

## 2019-08-01 RX ADMIN — OXYCODONE HYDROCHLORIDE 5 MG: 5 TABLET ORAL at 18:58

## 2019-08-01 ASSESSMENT — ACTIVITIES OF DAILY LIVING (ADL)
PREVIOUS_RESPONSIBILITIES: SHOPPING;DRIVING
ADLS_ACUITY_SCORE: 13
ADLS_ACUITY_SCORE: 13

## 2019-08-01 NOTE — PROGRESS NOTES
08/01/19 1010   Quick Adds   Type of Visit Initial Occupational Therapy Evaluation   Living Environment   Lives With spouse  (2 children in their 40s and 3 grandchildren in teenage yrs)   Living Arrangements house   Home Accessibility stairs to enter home;stairs within home   Number of Stairs, Main Entrance 1   Number of Stairs, Within Home, Primary 8   Transportation Anticipated family or friend will provide   Living Environment Comment pt and wife are both retired, she will be able to assist pt as needed   Self-Care   Usual Activity Tolerance good   Current Activity Tolerance moderate   Activity/Exercise/Self-Care Comment pt has a shower chair   Functional Level   Ambulation 0-->independent   Transferring 0-->independent   Toileting 0-->independent  (elevated toilet)   Bathing 0-->independent  (step in shower)   Dressing 0-->independent   Fall history within last six months no   Prior Functional Level Comment pt independent at baseline   General Information   Onset of Illness/Injury or Date of Surgery - Date 08/01/19   Referring Physician Jose Martin PAZ   Patient/Family Goals Statement return home   Additional Occupational Profile Info/Pertinent History of Current Problem POD#1 R reverse TSA; pt has a history of several foot/toe procedures, most recent earlier this year, pt had been NWB but has recovered and has no limitations   Weight-Bearing Status - RUE nonweight-bearing   General Observations pt is R handed   Cognitive Status Examination   Orientation orientation to person, place and time   Level of Consciousness alert   Follows Commands (Cognition) WNL   Cognitive Comment pt somewhat impulsive and with impaired memory at times during session   Visual Perception   Visual Perception Wears glasses  (reading glasses)   Pain Assessment   Patient Currently in Pain Yes, see Vital Sign flowsheet  (4)   Range of Motion (ROM)   ROM Comment L UE AROM intact   Strength   Strength Comments L UE grossly intact   Mobility    Bed Mobility Comments SBA, verbal cues   Transfer Skills   Transfer Comments SBA   Transfer Skill: Bed to Chair/Chair to Bed   Level of Omar: Bed to Chair stand-by assist   Physical Assist/Nonphysical Assist: Bed to Chair verbal cues   Transfer Skill: Sit to Stand   Level of Omar: Sit/Stand stand-by assist   Physical Assist/Nonphysical Assist: Sit/Stand verbal cues   Balance   Balance Comments appropriate balance throughout session   Upper Body Dressing   Level of Omar: Dress Upper Body moderate assist (50% patients effort)   Physical Assist/Nonphysical Assist: Dress Upper Body verbal cues   Lower Body Dressing   Level of Omar: Dress Lower Body minimum assist (75% patients effort)   Toileting   Level of Omar: Toilet stand-by assist   Physical Assist/Nonphysical Assist: Toilet verbal cues   Grooming   Level of Omar: Grooming stand-by assist   Physical Assist/Nonphysical Assist: Grooming verbal cues   Instrumental Activities of Daily Living (IADL)   Previous Responsibilities shopping;driving   Activities of Daily Living Analysis   Impairments Contributing to Impaired Activities of Daily Living coordination impaired;pain;post surgical precautions;strength decreased   General Therapy Interventions   Planned Therapy Interventions ADL retraining;IADL retraining;transfer training;progressive activity/exercise   Clinical Impression   Criteria for Skilled Therapeutic Interventions Met yes, treatment indicated   OT Diagnosis imaired ability to manage ADL/IADLs safely    Influenced by the following impairments post-op pain, fatigue, impaired activity tolerance,    Assessment of Occupational Performance 3-5 Performance Deficits   Identified Performance Deficits dressing, bathing, toileting, meals, driving   Clinical Decision Making (Complexity) Low complexity   Therapy Frequency Daily   Predicted Duration of Therapy Intervention (days/wks) one time eval/treat   Anticipated  "Discharge Disposition Home with Assist   Risks and Benefits of Treatment have been explained. Yes   Patient, Family & other staff in agreement with plan of care Yes   Clinical Impression Comments demonstrate ability to benefit from skilled OT services   [facility] TM \"6 Clicks\"   2016, Trustees of [facility], under license to [employer].  All rights reserved.   6 Clicks Short Forms Daily Activity Inpatient Short Form   [facility]  \"6 Clicks\" Daily Activity Inpatient Short Form   1. Putting on and taking off regular lower body clothing? 3 - A Little   2. Bathing (including washing, rinsing, drying)? 3 - A Little   3. Toileting, which includes using toilet, bedpan or urinal? 4 - None   4. Putting on and taking off regular upper body clothing? 3 - A Little   5. Taking care of personal grooming such as brushing teeth? 4 - None   6. Eating meals? 4 - None   Daily Activity Raw Score (Score out of 24.Lower scores equate to lower levels of function) 21   Total Evaluation Time   Total Evaluation Time (Minutes) 8     "

## 2019-08-01 NOTE — PROGRESS NOTES
"PRIMARY DIAGNOSIS: \"GENERIC\" NURSING  OUTPATIENT/OBSERVATION GOALS TO BE MET BEFORE DISCHARGE:  1. ADLs back to baseline: Yes    2. Activity and level of assistance: Up with standby assistance.    3. Pain status: Improved-controlled with oral pain medications.    4. Return to near baseline physical activity: Yes     Discharge Planner Nurse   Safe discharge environment identified: Yes  Barriers to discharge: No       Entered by: Miley Delaney 08/01/2019 10:52 AM     Please review provider order for any additional goals.   Nurse to notify provider when observation goals have been met and patient is ready for discharge.  "

## 2019-08-01 NOTE — PLAN OF CARE
Discharge Planner PT   Patient plan for discharge: Not known.   Current status: Per chart and discussion with OT: patient up with SBA. Evaluating OT reports no IP PT needs at this time.   Barriers to return to prior living situation: Defer to OT  Recommendations for discharge: Defer to OT  Rationale for recommendations: No IP PT needs identified. Will complete order. Please reorder if status changes.        Entered by: Juan Isaacs 08/01/2019 2:07 PM

## 2019-08-01 NOTE — OP NOTE
Procedure Date: 07/31/2019      PREOPERATIVE DIAGNOSIS:  Right shoulder end-stage osteoarthritis.      POSTOPERATIVE DIAGNOSIS:  Right shoulder end-stage osteoarthritis.      PROCEDURE:  Right reverse total shoulder arthroplasty.      SURGEON:  Per Yepez MD      ASSISTANT:  Aldo Philippe PA-C      ANESTHESIA:  General.      ESTIMATED BLOOD LOSS:  150 mL.      INTRAOPERATIVE COMPLICATIONS:  None apparent.      OPERATIVE INDICATIONS:  The patient has a long history of progressive right shoulder pain with a pseudoparalysis of the right shoulder and extensive deformity with a type B2 glenoid wear pattern.  Given the significance of his symptoms with overall impact on his quality of life, he elected to proceed with a right reverse total shoulder arthroplasty.      DESCRIPTION OF PROCEDURE:  The patient was identified in the preoperative holding area and the operative site was marked.  The consent was again reviewed and all questions were answered.  He was taken to the operating room, placed under general anesthesia, positioned in the beach chair position with the right upper extremity prepped and draped in the usual sterile fashion.  Preoperative antibiotics administered.  A timeout called to ensure the correct operative site and procedure.  He had a previous procedure and his old deltopectoral incision was opened sharply with dissection down through skin and subcutaneous tissues.  Dissection was carried down to the cephalic vein, which was retracted laterally with the deltoid and the pectoralis was retracted medially.  Subdeltoid fascia was released.  The biceps was opened and tenodesed to the upper border of the pectoralis.  The rotator interval was opened.  The lesser tuberosity osteotomy was completed.  The proximal humerus was then dislocated and the humeral head exposed.  Sequential reaming and broaching commenced up to a size 10, at which time a mini size 10 stem was broached in 30 degrees of retroversion.  A  head protector was placed and the glenoid was then exposed.  A 4-sided release of the subscapularis was performed.  There was a previous procedure done through the subscapularis and there were old Ethibond sutures and significant scarring with lack of mobility of the subscapularis and this was therefore carefully released.  The axillary nerve was palpated and protected as labrum was excised around the peripheral rim of the glenoid.  Utilizing the 3-D printed guide and referencing off the model, the guide was placed to allow for center position of the central pin.  The pin was then positioned; it was felt to match the positioning on the model.  This was positioned to 10 degrees of tilt.  Reaming commenced along the anterior margin until 50% of the cancellous bone was exposed.  After assessing the guide, the large augment was felt to be most appropriate.  The reaming guide was positioned and placed and the posterior ream was completed to allow for position of the augment.  A large augmented baseplate was therefore opened and seated in place.  The central 35 mm nonlocking screw had excellent purchase.  The superior 35 mm inferior 20, anterior 20 and posterior 15 screws were positioned in a locking fashion.  The 40 mm glenosphere was then opened and seated onto the baseplate with excellent secure purchase.  The proximal humerus was once again exposed and trialing commenced with the +3 offset humeral tray.  This showed excellent stability.  The final size 10 Biomet mini stem was opened and seated to the same level.  After trialing, the +3 mm offset humeral tray with standard polyethylene insert was felt to be most appropriate.  This was put together and the taper lock was secured.  The humeral tray was again reduced onto the glenosphere and the shoulder was taken through range of motion with excellent stability and no impingement through range of motion.  Three #5 FiberWires were placed through drill holes at the lesser  tuberosity osteotomy site.  The free ends were placed through the medial aspect of the osteotomy attached to the subscapularis.  Three rack and hitch type sutures were secured with the bone while compressed back against the site of the osteotomy.  The shoulder was once again taken through range of motion and then the repair was felt to be secure.  The wound was soaked in diluted Betadine solution followed by irrigation with pulsatile lavage.  The deltopectoral interval was closed with running #1 Vicryl suture.  The remainder of the wound was closed in layers.  Sterile dressings and a Super Sling were applied.  The patient was then awoken from general anesthesia and transferred to the postanesthesia care unit in stable condition.        Aldo Philippe PA-C was present and scrubbed throughout the case and his assistance was critical for patient positioning, assistance with prepping, draping, soft tissue retraction, arm manipulation, wound closure, dressing and sling application.         EMMANUEL WALTERS MD             D: 2019   T: 2019   MT: MELANI      Name:     NAY VIEIRA   MRN:      [id number]        Account:        [id number]   :      1950           Procedure Date: 2019      Document: [id number]

## 2019-08-01 NOTE — DISCHARGE INSTRUCTIONS
Urology General Number : [phone]    Urology [place] Location: [phone]    PLease call to make an appointment regarding your catheter removal, voiding trial, and whether or not to continue taking flomax    Patient to follow up with appointment in 2 weeks. Sling at all times besides hygiene. No active shoulder movement. Ok to come out of sling for elbow and wrist ROM but no shoulder ROM. Aquacel is water proof so ok to get wet in shower but do not submerge in water. Call if uncontrolled pain, fever >101 or excessive drainage from bandage.           Follow up with Urology clinic re: a voiding trial in 5-7 days.  Discuss whether you should continue flomax at that point.    No use of right arm. In sling at all times besides hygiene and when relaxing with no shoulder ROM.

## 2019-08-01 NOTE — PROGRESS NOTES
Patient has returned to baseline mental status: Yes, A&O  Patient vital signs are at baseline: Yes, HR matty at baseline, on RA  Patient able to ambulate as they were prior to admission or with assist devices provided by therapies during their stay: Up with SBA, RUE sling in use  Patient voiding No; required additional straight cath for 1375 mL PVR. Only able to void 100 mL independently  Patient able to tolerate oral intake: Yes  Patient has adequate pain control using Oral analgesics: Attempted oxycodone PO, pain increased to 7/10. Additional oxycodone with atarax given, will monitor

## 2019-08-01 NOTE — PROGRESS NOTES
Patient has returned to baseline mental status: Yes, A&O, at mental baseline  Patient vital signs are at baseline: VSS on RA  Patient able to ambulate as they were prior to admission or with assist devices provided by therapies during their stay: Pt up with SBA; RUE sling in use  Patient voiding: No; required straight cath and currently no urge to void. Will need to reassess overnight  Patient able to tolerate oral intake: Yes, now SL and tolerating regular diet  Patient has adequate pain control using Oral analgesics: Denies pain currently, yet to trial PO

## 2019-08-01 NOTE — PROGRESS NOTES
Orthopedic Surgery  Tanner Pickett  2019  Admit Date:  2019  POD # 1 right R TSA    Tanner Pickett is a 70 y/o male whom was rounded on 1 day s/p right reverse total shoulder arthroplasty.  Pain controlled.  Tolerating oral intake.  No events overnight. The patient denies chest pain, SOB, calf pain.    Alert and orient to person, place, and time.  Vital Sign Ranges  Temperature Temp  Av.3  F (35.7  C)  Min: 95.4  F (35.2  C)  Max: 98  F (36.7  C)   Blood pressure Systolic (24hrs), Av , Min:113 , Max:165        Diastolic (24hrs), Av, Min:59, Max:84      Pulse Pulse  Av.3  Min: 47  Max: 69   Respirations Resp  Av.3  Min: 8  Max: 22   Pulse oximetry SpO2  Av.9 %  Min: 86 %  Max: 100 %       Right shoulder dressing is clean, dry, and intact. Minimal erythema of the surrounding skin and no signs of infection  Bilateral calves are soft, non-tender.  right upper extremity is NVI. 2+ radial pulse   Able to actively move distal extremity  5/5 strength distally    Labs:  Recent Labs   Lab Test 19  1156 16  0450 16  2030   POTASSIUM 4.6 4.1 3.6     Recent Labs   Lab Test 16  1933 16  0621 16  1807   HGB 9.7* 9.2* 9.4*     Recent Labs   Lab Test 16  2023   INR 1.08     Recent Labs   Lab Test 16  1933 16  0450 16  2023    277 346       A/P  1. POD #1 s/p right reverse TSA   Continue Eliquis for DVT prophylaxis.     Mobilize with PT/OT NWB with R arm. Ok for elbow and wrist ROM but no Codmans either     Continue current pain regiment.    2. Disposition   Anticipate d/c to home today. Orders done. Pending continual strengthening and ambulation with PT.    Aldo Philippe PA-C  [phone]

## 2019-08-01 NOTE — CONSULTS
[facility]  Hospitalist Consult Note  Name: Tanner Pickett    MRN: [id number]  YOB: 1950    Age: 69 year old  Date of admission: 7/31/2019  Primary care provider: Ifeoma Hill     Requesting Physician: Dr. Yepez  Reason for consult:  Post-operative medical management         Assessment and Plan:   Tanner Pickett is a 69 year old male with a history of hypertension, shoulder fracture, clavicle fracture, A. fib on Eliquis who was admitted for total shoulder arthroplasty with Dr. yepez.  I have been asked to consult postoperatively for medical comanagement    1. DJD s/p total shoulder arthroplasty on 7/31/2019: The patient is doing well, currently has well controlled pain and is hemodynamically stable. Will defer diet, activity, DVT prophylaxis, and pain control to the primary team.  I note his primary team has resumed his usual oral Eliquis.    2.  History of A. Fib: Chronically on oral Eliquis, diltiazem and metoprolol.  No apparent acute issues.  I did discuss with him that sometimes will hold Eliquis 48 hours after surgery but would be okay in the situation doing whatever his primary care doctor recommended.  Indicated that if no recommendation was given that 48 hours would be reasonable.  Currently beta blocked with borderline bradycardia.  Heart rate charted as low as 47 but mostly in the 50s.  Previous EKGs from [facility] showed sinus bradycardia with bifascicular block.  Unless he is symptomatic or manifesting more severe bradycardia I do not think it is reasonable to change his rate controlling meds at this time.  Furthermore, he actually has an vijaya on his smart phone with which he frequently monitors his heart rate.  Reviewed this with him and frequently has been in the low 50s in the past.  It seems he is not significantly deviated from his usual baseline.    3.  Hypertension: Well-controlled.  Currently on metoprolol and diltiazem.    4.  Intractable hiccups: He  notes this was an issue after prior surgery.  Possibly worse due to narcotics.  He specifically requested I prescribe a medication he has been given in the past to treat his hiccups as they have been very bothersome.  We dog through care everywhere records for quite a while and found that he was actually prescribed Thorazine which worked well.  Given a dose today and a supply of just 3 pills to take at home tomorrow.  Follow-up as needed.    5.  Urinary retention: Required straight cath x2 overnight, still had a postvoid residual of greater than 600 today.  Recommend a brief trial of Flomax and short-term follow-up in urology clinic for a voiding trial.  Suspect narcotics are in part responsible for urinary retention though he may have BPH as well.    Defer disposition to primary team.  I note discharge orders have already been placed.    Thank you for the consultation, we will continue to follow along during the hospitalization. Please page with any questions or concerns.         History of Present Illness:   Tanner Pickett is a 69 year old male with a history of hypertension, shoulder fracture, clavicle fracture, A. fib on Eliquis who was admitted for total shoulder arthroplasty with Dr. puri.  I have been asked to consult postoperatively for medical comanagement.      As noted above, he has been noted to be in sinus bradycardia here.  I reviewed his smart phone vijaya with him and he has been monitoring his heart rate frequently in the past due to his history of A. fib.  On review of that he has been in sinus bradycardia frequently with rates in the low 50s.  He has not had symptoms, chest pain, shortness of breath or presyncope.    Furthermore, he complains of Intractable hiccups: He notes this was an issue after prior surgery.  Possibly worse due to narcotics.  He specifically requested I prescribe a medication he has been given in the past to treat his hiccups as they have been very bothersome.  We dog through  care everywhere records for quite a while and found that he was actually prescribed Thorazine which worked well.  Given a dose today and a supply of just 3 pills to take at home tomorrow.  Follow-up as needed.    We did discuss use of Eliquis as above.  I suggested to him that often we wait 48 hours after surgery but they think that his primary care doctor may have given him the okay to start immediately after surgery.  Ultimately I defer to the recommendation he was given from clinic but did indicate that if it was not clear I would wait 48 hours prior to starting.    Otherwise he feels well and plans to discharge home later today.              Past Medical History:     Past Medical History:   Diagnosis Date     Arrhythmia     Hx of Afib     Clavicle fracture      Constipation      High cholesterol      History of blood transfusion      Hypertension      Multiple rib fractures      Osteoarthritis     generalized     Pneumothorax      Shoulder fracture              Past Surgical History:     Past Surgical History:   Procedure Laterality Date     ESOPHAGOSCOPY, GASTROSCOPY, DUODENOSCOPY (EGD), COMBINED  12/31/2015    Dr. Delarosa [facility]     ESOPHAGOSCOPY, GASTROSCOPY, DUODENOSCOPY (EGD), COMBINED N/A 12/31/2015    Procedure: COMBINED ESOPHAGOSCOPY, GASTROSCOPY, DUODENOSCOPY (EGD);  Surgeon: Jennifer Delarosa MD;  Location:  GI     ESOPHAGOSCOPY, GASTROSCOPY, DUODENOSCOPY (EGD), COMBINED N/A 1/2/2016    Procedure: COMBINED ESOPHAGOSCOPY, GASTROSCOPY, DUODENOSCOPY (EGD);  Surgeon: Loida Portillo MD;  Location:  GI     HEAD & NECK SURGERY      wisdom teeth     ORTHOPEDIC SURGERY      right shoulder surgery age 25 yrs.     ORTHOPEDIC SURGERY Left 2019    great toe surgery for arthritis               Social History:     Social History     Tobacco Use     Smoking status: Former Smoker     Smokeless tobacco: Never Used   Substance Use Topics     Alcohol use: Yes     Comment: beer 3-4 times per week and rare  glass of wine             Family History:   Family history was fully reviewed and non-contributory in this case.          Allergies:   No Known Allergies          Medications:     Prior to Admission medications    Medication Sig Last Dose Taking? Auth Provider   acetaminophen (TYLENOL) 325 MG tablet Take 2 tablets (650 mg) by mouth every 4 hours as needed for other (mild pain)  Yes Aldo Philippe PA-C   apixaban ANTICOAGULANT (ELIQUIS) 5 MG tablet Take 5 mg by mouth 2 times daily Afib Past Week at Unknown time Yes Reported, Patient   diltiazem ER (DILT-XR) 120 MG 24 hr capsule Take 120 mg by mouth daily 7/31/2019 at Unknown time Yes Reported, Patient   gabapentin (NEURONTIN) 300 MG capsule Take 300 mg by mouth At Bedtime 7/30/2019 at Unknown time Yes Reported, Patient   hydrOXYzine (ATARAX) 25 MG tablet Take 1 tablet (25 mg) by mouth 3 times daily as needed (muscle spasms and breakthrough pain)  Yes Aldo Philippe PA-C   ibuprofen (ADVIL/MOTRIN) 600 MG tablet Take 1 tablet (600 mg) by mouth every 6 hours as needed for pain (mild)  Yes Aldo Philippe PA-C   ipratropium (ATROVENT) 0.03 % nasal spray Spray 1 spray into both nostrils 3 times daily  7/31/2019 at Unknown time Yes Unknown, Entered By History   Loratadine (CLARITIN PO) Take 10 mg by mouth At Bedtime  7/30/2019 at Unknown time Yes Reported, Patient   metoprolol succinate ER (TOPROL-XL) 100 MG 24 hr tablet Take 100 mg by mouth 2 times daily 7/31/2019 at Unknown time Yes Reported, Patient   multivitamin, therapeutic with minerals (THERA-VIT-M) TABS Take 1 tablet by mouth daily 7/31/2019 at Unknown time Yes Unknown, Entered By History   ondansetron (ZOFRAN-ODT) 4 MG ODT tab Take 1-2 tablets (4-8 mg) by mouth every 8 hours as needed for nausea Dissolve ON the tongue.  Yes Aldo Philippe PA-C   oxyCODONE (ROXICODONE) 5 MG tablet Take 1-2 tablets (5-10 mg) by mouth every 3 hours as needed for pain (Moderate to Severe)  Yes Aldo Philippe PA-C  "  psyllium 0.52 G capsule Take 8 capsules by mouth daily Dose confirmed with pt 7/30/2019 at Unknown time Yes Reported, Patient   ranitidine (ZANTAC) 150 MG capsule Take 150 mg by mouth 2 times daily 7/31/2019 at Unknown time Yes Reported, Patient   rosuvastatin (CRESTOR) 10 MG tablet Take 10 mg by mouth daily 7/30/2019 at Unknown time Yes Reported, Patient   senna-docusate (SENOKOT-S/PERICOLACE) 8.6-50 MG tablet Take 1-2 tablets by mouth 2 times daily Take while on oral narcotics to prevent or treat constipation.  Yes Aldo Philippe PA-C       Current hospital administered medication list (MAR) also reviewed.          Review of Systems:   A comprehensive greater than 10 system review of systems was carried out.  Pertinent positives and negatives are noted above.  Otherwise negative for contributory info.            Physical Exam:   Blood pressure 130/50, pulse (!) 47, temperature 96.5  F (35.8  C), temperature source Oral, resp. rate 16, height 1.803 m (5' 11\"), weight 84.8 kg (187 lb), SpO2 98 %.  Exam:  GENERAL: No apparent distress. Awake, alert, and fully oriented.  HEENT: Normocephalic, atraumatic. Extraocular movements intact.  CARDIOVASCULAR: Borderline bradycardia, regular rhythm without murmurs or rubs. No S3.  PULMONARY: Clear bilaterally.  ABDOMINAL: Soft, non-tender, non-distended. Bowel sounds normoactive. No hepatosplenomegaly.  EXTREMITIES: Right arm and shoulder sling, no cyanosis or clubbing. No edema.  NEUROLOGICAL: CN 2-12 grossly intact, no focal neurological deficits.  DERMATOLOGICAL: No rash, ulcer, ecchymoses, jaundice.         Data:   Imaging:  Reviewed.    EKG/Telemetry:  Reviewed.    Labs: Reviewed.   No results for input(s): WBC, HGB, HCT, MCV, PLT in the last 168 hours.       Lab Results   Component Value Date     01/22/2016     01/21/2016     01/21/2016    Lab Results   Component Value Date    CHLORIDE 110 01/22/2016    CHLORIDE 105 01/21/2016    CHLORIDE 109 " 01/02/2016    Lab Results   Component Value Date    BUN 18 01/22/2016    BUN 18 01/21/2016    BUN 9 01/02/2016      Lab Results   Component Value Date    POTASSIUM 4.6 07/31/2019    POTASSIUM 4.1 01/22/2016    POTASSIUM 3.6 01/21/2016    Lab Results   Component Value Date    CO2 22 01/22/2016    CO2 21 01/21/2016    CO2 25 01/02/2016    Lab Results   Component Value Date    CR 0.86 01/22/2016    CR 1.03 01/21/2016    CR 1.01 01/02/2016          Lizandro Hillman MD  [facility] Hospitalist  August 1, 2019

## 2019-08-01 NOTE — PROGRESS NOTES
Arrived to room (625) from PACU at (1750) via cart, pivot transferred to bed without difficulty, alert and oriented x 4, oriented to room and call system, dressing CDI, CMS still absent on the arm, IV patent and infusing, due to void, rates pain (0)/10, reviewed welcome folder and pain/medication information packet with patient and (spouse). SCD's on BLE. Gayatri ice chips well. Frequent VS started. Will keep monitoring.   Pt ambulated to the 641 and back. Still retaining a significant amount, no sensation on right hand and no pain.

## 2019-08-01 NOTE — OR NURSING
Addendum made to correct 1% lidocaine with epinephrine- to delete.  Entered medication notes at 8/1/2019 to enter a medication dosage given which was not entered prior to discharging patient from OR      addenedum to straight catheter at end of case, and removed. 400cc urine return.

## 2019-08-01 NOTE — PLAN OF CARE
OT: Order received, chart reviewed, evaluation and treatment completed. Pt POD#1 R reverse TSA    Discharge Planner OT   Patient plan for discharge: home  Current status: Pt sit<>stand transfers and functional mobility ~300' with SBA; Pt and wife educated on post-op exercises and pt able to complete ~10 reps fist pumps, wrist and elbow flex/extension, SBA following demo; Pt and wife educated on post-op activity guidelines including sling management; pt able to follow instructions with activity however was impulsive and forgetful at times, wife supportive and receptive during session; pt able to doff sling, Min A following cues, donned sling with Mod A and cues; pt able to complete dressing tasks, Min A seated and standing, following instructions; pt receptive to shower and toilet transfer education including safety and completed transfers, SBA following cues  Barriers to return to prior living situation: none  Recommendations for discharge: home with wife for assist with IADLs such as cleaning, meals, driving, and laundry  Rationale for recommendations: pt has been able to demonstrate ability to manage self cares safely with wife assist/supervision as needed. No further IP OT needs.       Entered by: Livia Mullins 08/01/2019 12:35 PM     Occupational Therapy Discharge Summary    Reason for therapy discharge:    Discharged to home.  All goals and outcomes met, no further needs identified.    Progress towards therapy goal(s). See goals on Care Plan in [facility] electronic health record for goal details.  Goals met    Therapy recommendation(s):    Continue home exercise program.

## 2019-08-02 NOTE — PROGRESS NOTES
Pt discharged home with wife transporting and assisting at home. Pt discharged home with a marie. Pt and wife educated about catheter care, follow up appointments and precautions. Belongings returned.

## 2019-08-03 NOTE — DISCHARGE SUMMARY
Admit Date:     2019   Discharge Date:     2019      ADMISSION DIAGNOSIS:  Right shoulder end-stage osteoarthritis.      DISCHARGE DIAGNOSIS:  Right shoulder end-stage osteoarthritis.      PROCEDURE PERFORMED DURING HOSPITALIZATION:  Right reverse total shoulder arthroplasty.      HOSPITAL COURSE:  The patient underwent a right reverse total shoulder arthroplasty without intraoperative complication.  Postoperatively, he was up and ambulatory with physical therapy.  He was neurovascularly intact.  He had adequate pain control.  He demonstrated appropriate wrist and elbow range of motion out of the sling.  At that time, it was felt that he was stable for discharge to home.      DISCHARGE INSTRUCTIONS:  The patient will come out of the sling for wrist and elbow range of motion as well as gentle Codman's.  He will avoid active range of motion.  He will follow up in 2 weeks as scheduled.         EMMANUEL WALTERS MD             D: 2019   T: 2019   MT: MARCELO      Name:     NAY VIEIRA   MRN:      [id number]        Account:        [id number]   :      1950           Admit Date:     2019                                  Discharge Date: 2019      Document: [id number]       cc: Ifeoma Hill MD

## 2019-08-07 ENCOUNTER — OFFICE VISIT (OUTPATIENT)
Dept: UROLOGY | Facility: CLINIC | Age: 69
End: 2019-08-07
Payer: COMMERCIAL

## 2019-08-07 VITALS
DIASTOLIC BLOOD PRESSURE: 70 MMHG | SYSTOLIC BLOOD PRESSURE: 110 MMHG | WEIGHT: 193 LBS | HEIGHT: 71 IN | BODY MASS INDEX: 27.02 KG/M2 | HEART RATE: 62 BPM

## 2019-08-07 DIAGNOSIS — N40.1 BENIGN PROSTATIC HYPERPLASIA WITH NOCTURIA: Primary | ICD-10-CM

## 2019-08-07 DIAGNOSIS — R35.1 BENIGN PROSTATIC HYPERPLASIA WITH NOCTURIA: Primary | ICD-10-CM

## 2019-08-07 PROCEDURE — 99203 OFFICE O/P NEW LOW 30 MIN: CPT | Performed by: PHYSICIAN ASSISTANT

## 2019-08-07 RX ORDER — TAMSULOSIN HYDROCHLORIDE 0.4 MG/1
0.4 CAPSULE ORAL DAILY
Qty: 90 CAPSULE | Refills: 4 | Status: SHIPPED | OUTPATIENT
Start: 2019-08-07

## 2019-08-07 ASSESSMENT — MIFFLIN-ST. JEOR: SCORE: 1662.57

## 2019-08-07 ASSESSMENT — PAIN SCALES - GENERAL: PAINLEVEL: NO PAIN (0)

## 2019-08-07 NOTE — PROGRESS NOTES
CC: Urinary retention.    HPI: It is a pleasure to see Mr. Tanner Pickett, a 69 year old male seen today in the urology clinic in hospital follow up of urinary retention. This developed acutely requiring Estrada catheter placement on . This has been draining well with pale, yellow urine. The patient is tolerating the catheter without issue. No clots of hematuria noted.    The patient has no prior history of AUR or similar symptoms. At baseline, patient does note some nocturia x 2-3, needing to double void, rare post-void dribbling. Currently denies fevers, chills, N/V, abdominal/back pain.    On Flomax since Estrada placed.     PSA out of date.    Past Medical History:   Diagnosis Date     Arrhythmia     Hx of Afib     Clavicle fracture      Constipation      High cholesterol      History of blood transfusion      Hypertension      Multiple rib fractures      Osteoarthritis     generalized     Pneumothorax      Shoulder fracture      Past Surgical History:   Procedure Laterality Date     ESOPHAGOSCOPY, GASTROSCOPY, DUODENOSCOPY (EGD), COMBINED  12/31/2015    Dr. Delarosa [facility]     ESOPHAGOSCOPY, GASTROSCOPY, DUODENOSCOPY (EGD), COMBINED N/A 12/31/2015    Procedure: COMBINED ESOPHAGOSCOPY, GASTROSCOPY, DUODENOSCOPY (EGD);  Surgeon: Jennifer Delarosa MD;  Location:  GI     ESOPHAGOSCOPY, GASTROSCOPY, DUODENOSCOPY (EGD), COMBINED N/A 1/2/2016    Procedure: COMBINED ESOPHAGOSCOPY, GASTROSCOPY, DUODENOSCOPY (EGD);  Surgeon: Loida Portillo MD;  Location:  GI     HEAD & NECK SURGERY      wisdom teeth     ORTHOPEDIC SURGERY      right shoulder surgery age 25 yrs.     ORTHOPEDIC SURGERY Left 2019    great toe surgery for arthritis     REVERSE ARTHROPLASTY SHOULDER Right 7/31/2019    Procedure: Right reverse total shoulder arthroplasty.;  Surgeon: Per Yepez MD;  Location:  OR     Current Outpatient Medications   Medication Sig Dispense Refill     acetaminophen (TYLENOL) 325 MG tablet Take 2 tablets  (650 mg) by mouth every 4 hours as needed for other (mild pain) 60 tablet 0     apixaban ANTICOAGULANT (ELIQUIS) 5 MG tablet Take 5 mg by mouth 2 times daily Afib       chlorproMAZINE (THORAZINE) 10 MG tablet Take 1 tablet (10 mg) by mouth 3 times daily 3 tablet 0     diltiazem ER (DILT-XR) 120 MG 24 hr capsule Take 120 mg by mouth daily       gabapentin (NEURONTIN) 300 MG capsule Take 300 mg by mouth At Bedtime       hydrOXYzine (ATARAX) 25 MG tablet Take 1 tablet (25 mg) by mouth 3 times daily as needed (muscle spasms and breakthrough pain) 50 tablet 0     ibuprofen (ADVIL/MOTRIN) 600 MG tablet Take 1 tablet (600 mg) by mouth every 6 hours as needed for pain (mild) 30 tablet 0     ipratropium (ATROVENT) 0.03 % nasal spray Spray 1 spray into both nostrils 3 times daily        Loratadine (CLARITIN PO) Take 10 mg by mouth At Bedtime        metoprolol succinate ER (TOPROL-XL) 100 MG 24 hr tablet Take 100 mg by mouth 2 times daily       multivitamin, therapeutic with minerals (THERA-VIT-M) TABS Take 1 tablet by mouth daily       ondansetron (ZOFRAN-ODT) 4 MG ODT tab Take 1-2 tablets (4-8 mg) by mouth every 8 hours as needed for nausea Dissolve ON the tongue. 4 tablet 0     oxyCODONE (ROXICODONE) 5 MG tablet Take 1-2 tablets (5-10 mg) by mouth every 3 hours as needed for pain (Moderate to Severe) 50 tablet 0     psyllium 0.52 G capsule Take 8 capsules by mouth daily Dose confirmed with pt       ranitidine (ZANTAC) 150 MG capsule Take 150 mg by mouth 2 times daily       rosuvastatin (CRESTOR) 10 MG tablet Take 10 mg by mouth daily       senna-docusate (SENOKOT-S/PERICOLACE) 8.6-50 MG tablet Take 1-2 tablets by mouth 2 times daily Take while on oral narcotics to prevent or treat constipation. 50 tablet 0     tamsulosin (FLOMAX) 0.4 MG capsule Take 1 capsule (0.4 mg) by mouth daily 30 capsule 0     No Known Allergies  Family History: There is no h/o  malignancy.  There is no h/o urolithiasis.     Social History  "    Socioeconomic History     Marital status:      Spouse name: Not on file     Number of children: Not on file     Years of education: Not on file     Highest education level: Not on file   Occupational History     Not on file   Social Needs     Financial resource strain: Not on file     Food insecurity:     Worry: Not on file     Inability: Not on file     Transportation needs:     Medical: Not on file     Non-medical: Not on file   Tobacco Use     Smoking status: Former Smoker     Smokeless tobacco: Never Used   Substance and Sexual Activity     Alcohol use: Yes     Comment: beer 3-4 times per week and rare glass of wine     Drug use: No     Sexual activity: Not on file   Lifestyle     Physical activity:     Days per week: Not on file     Minutes per session: Not on file     Stress: Not on file   Relationships     Social connections:     Talks on phone: Not on file     Gets together: Not on file     Attends Evangelical service: Not on file     Active member of club or organization: Not on file     Attends meetings of clubs or organizations: Not on file     Relationship status: Not on file     Intimate partner violence:     Fear of current or ex partner: Not on file     Emotionally abused: Not on file     Physically abused: Not on file     Forced sexual activity: Not on file   Other Topics Concern     Parent/sibling w/ CABG, MI or angioplasty before 65F 55M? Not Asked   Social History Narrative     Not on file       ROS: A comprehensive 14 point ROS was obtained and was  otherwise negative except for that outlined above in the HPI.    PHYSICAL EXAM:   /70   Pulse 62   Ht 1.803 m (5' 11\")   Wt 87.5 kg (193 lb)   BMI 26.92 kg/m    PSYCH: NAD  EYES: EOMI  MOUTH: MMM  NECK: Supple, no notable adenopathy  RESP: Unlabored breathing  CARDIAC: No LE edema  SKIN: Warm, no rashes  ABD: soft, Nontender  NEURO: AAO x3  : CLear, balloon deflated and removed intact    REVIEW OF OUTSIDE RECORDS: 5 minutes spent " reviewing previous/outside records.    ASSESSMENT/PLAN:  69 year old male who developed acute urinary retention requiring Estrada catheter placement in the post op setting. Has been taking tamsulosin daily.   - Refill of flomax  -RVR in 4 weeks with BEKAH  -PSA in 4-6 weeks    Should the patient continue to have voiding difficulty, the next appropriate studies would be cystoscopy to evaluate for bladder outlet obstruction.    Clemencia Morgan PA-C  [facility]    30 min spent with the patient, >50% of this time was spent in a face-to-face manner and on coordination of care of urinary retention.

## 2019-08-07 NOTE — PATIENT INSTRUCTIONS
Continue on tamsulosin. (30 min post a meal)    PSA screening for prostate cancer in 6 weeks. (blood test).    See you in a month!

## 2019-08-07 NOTE — LETTER
8/7/2019       RE: Tanner Pickett  [address] [address]     Dear Colleague,    Thank you for referring your patient, Tanner Pickett, to the [facility]OLOG[facility] [place] at [facility]. Please see a copy of my visit note below.    CC: Urinary retention.    HPI: It is a pleasure to see Mr. Tanner Pickett, a 69 year old male seen today in the urology clinic in hospital follow up of urinary retention. This developed acutely requiring Estrada catheter placement on . This has been draining well with pale, yellow urine. The patient is tolerating the catheter without issue. No clots of hematuria noted.    The patient has no prior history of AUR or similar symptoms. At baseline, patient does note some nocturia x 2-3, needing to double void, rare post-void dribbling. Currently denies fevers, chills, N/V, abdominal/back pain.    On Flomax since Estrada placed.     PSA out of date.    Past Medical History:   Diagnosis Date     Arrhythmia     Hx of Afib     Clavicle fracture      Constipation      High cholesterol      History of blood transfusion      Hypertension      Multiple rib fractures      Osteoarthritis     generalized     Pneumothorax      Shoulder fracture      Past Surgical History:   Procedure Laterality Date     ESOPHAGOSCOPY, GASTROSCOPY, DUODENOSCOPY (EGD), COMBINED  12/31/2015    Dr. Isaura RODRIGUEZ     ESOPHAGOSCOPY, GASTROSCOPY, DUODENOSCOPY (EGD), COMBINED N/A 12/31/2015    Procedure: COMBINED ESOPHAGOSCOPY, GASTROSCOPY, DUODENOSCOPY (EGD);  Surgeon: Jennifer Delarosa MD;  Location:  GI     ESOPHAGOSCOPY, GASTROSCOPY, DUODENOSCOPY (EGD), COMBINED N/A 1/2/2016    Procedure: COMBINED ESOPHAGOSCOPY, GASTROSCOPY, DUODENOSCOPY (EGD);  Surgeon: Loida Portillo MD;  Location:  GI     HEAD & NECK SURGERY      wisdom teeth     ORTHOPEDIC SURGERY      right shoulder surgery age 25 yrs.     ORTHOPEDIC SURGERY Left 2019     great toe surgery for arthritis     REVERSE ARTHROPLASTY SHOULDER Right 7/31/2019    Procedure: Right reverse total shoulder arthroplasty.;  Surgeon: Per Yepez MD;  Location: [facility] OR     Current Outpatient Medications   Medication Sig Dispense Refill     acetaminophen (TYLENOL) 325 MG tablet Take 2 tablets (650 mg) by mouth every 4 hours as needed for other (mild pain) 60 tablet 0     apixaban ANTICOAGULANT (ELIQUIS) 5 MG tablet Take 5 mg by mouth 2 times daily Afib       chlorproMAZINE (THORAZINE) 10 MG tablet Take 1 tablet (10 mg) by mouth 3 times daily 3 tablet 0     diltiazem ER (DILT-XR) 120 MG 24 hr capsule Take 120 mg by mouth daily       gabapentin (NEURONTIN) 300 MG capsule Take 300 mg by mouth At Bedtime       hydrOXYzine (ATARAX) 25 MG tablet Take 1 tablet (25 mg) by mouth 3 times daily as needed (muscle spasms and breakthrough pain) 50 tablet 0     ibuprofen (ADVIL/MOTRIN) 600 MG tablet Take 1 tablet (600 mg) by mouth every 6 hours as needed for pain (mild) 30 tablet 0     ipratropium (ATROVENT) 0.03 % nasal spray Spray 1 spray into both nostrils 3 times daily        Loratadine (CLARITIN PO) Take 10 mg by mouth At Bedtime        metoprolol succinate ER (TOPROL-XL) 100 MG 24 hr tablet Take 100 mg by mouth 2 times daily       multivitamin, therapeutic with minerals (THERA-VIT-M) TABS Take 1 tablet by mouth daily       ondansetron (ZOFRAN-ODT) 4 MG ODT tab Take 1-2 tablets (4-8 mg) by mouth every 8 hours as needed for nausea Dissolve ON the tongue. 4 tablet 0     oxyCODONE (ROXICODONE) 5 MG tablet Take 1-2 tablets (5-10 mg) by mouth every 3 hours as needed for pain (Moderate to Severe) 50 tablet 0     psyllium 0.52 G capsule Take 8 capsules by mouth daily Dose confirmed with pt       ranitidine (ZANTAC) 150 MG capsule Take 150 mg by mouth 2 times daily       rosuvastatin (CRESTOR) 10 MG tablet Take 10 mg by mouth daily       senna-docusate (SENOKOT-S/PERICOLACE) 8.6-50 MG tablet Take 1-2 tablets by  "mouth 2 times daily Take while on oral narcotics to prevent or treat constipation. 50 tablet 0     tamsulosin (FLOMAX) 0.4 MG capsule Take 1 capsule (0.4 mg) by mouth daily 30 capsule 0     No Known Allergies  Family History: There is no h/o  malignancy.  There is no h/o urolithiasis.     Social History     Socioeconomic History     Marital status:      Spouse name: Not on file     Number of children: Not on file     Years of education: Not on file     Highest education level: Not on file   Occupational History     Not on file   Social Needs     Financial resource strain: Not on file     Food insecurity:     Worry: Not on file     Inability: Not on file     Transportation needs:     Medical: Not on file     Non-medical: Not on file   Tobacco Use     Smoking status: Former Smoker     Smokeless tobacco: Never Used   Substance and Sexual Activity     Alcohol use: Yes     Comment: beer 3-4 times per week and rare glass of wine     Drug use: No     Sexual activity: Not on file   Lifestyle     Physical activity:     Days per week: Not on file     Minutes per session: Not on file     Stress: Not on file   Relationships     Social connections:     Talks on phone: Not on file     Gets together: Not on file     Attends Episcopalian service: Not on file     Active member of club or organization: Not on file     Attends meetings of clubs or organizations: Not on file     Relationship status: Not on file     Intimate partner violence:     Fear of current or ex partner: Not on file     Emotionally abused: Not on file     Physically abused: Not on file     Forced sexual activity: Not on file   Other Topics Concern     Parent/sibling w/ CABG, MI or angioplasty before 65F 55M? Not Asked   Social History Narrative     Not on file       ROS: A comprehensive 14 point ROS was obtained and was  otherwise negative except for that outlined above in the HPI.    PHYSICAL EXAM:   /70   Pulse 62   Ht 1.803 m (5' 11\")   Wt 87.5 " kg (193 lb)   BMI 26.92 kg/m     PSYCH: NAD  EYES: EOMI  MOUTH: MMM  NECK: Supple, no notable adenopathy  RESP: Unlabored breathing  CARDIAC: No LE edema  SKIN: Warm, no rashes  ABD: soft, Nontender  NEURO: AAO x3  : CLear, balloon deflated and removed intact    REVIEW OF OUTSIDE RECORDS: 5 minutes spent reviewing previous/outside records.    ASSESSMENT/PLAN:  69 year old male who developed acute urinary retention requiring Estrada catheter placement in the post op setting. Has been taking tamsulosin daily.   - Refill of flomax  -RVR in 4 weeks with BEKAH  -PSA in 4-6 weeks    Should the patient continue to have voiding difficulty, the next appropriate studies would be cystoscopy to evaluate for bladder outlet obstruction.    Clemencia Morgan PA-C  [facility]    30 min spent with the patient, >50% of this time was spent in a face-to-face manner and on coordination of care of urinary retention.     Again, thank you for allowing me to participate in the care of your patient.      Sincerely,    Clemencia Morgan PA-C

## 2019-08-28 ENCOUNTER — TELEPHONE (OUTPATIENT)
Dept: UROLOGY | Facility: CLINIC | Age: 69
End: 2019-08-28

## 2019-08-28 DIAGNOSIS — N40.0 BPH (BENIGN PROSTATIC HYPERPLASIA): Primary | ICD-10-CM

## 2019-08-28 NOTE — TELEPHONE ENCOUNTER
Called patient's wife and LM. There is an order in the computer for PSA, it would need to drawn before upcoming appointment at any [facility] lab. Number for lab in  is [phone].    Melissa Garcia LPN

## 2019-08-28 NOTE — TELEPHONE ENCOUNTER
[facility]    Phone Message    May a detailed message be left on voicemail: yes    Reason for Call: Other: Patient's wife called said she thought the patient was having a PSA test on next appointment. Please call to inform if this is going to be done.     Action Taken: Other: p urology

## 2019-09-09 DIAGNOSIS — N40.0 BPH (BENIGN PROSTATIC HYPERPLASIA): ICD-10-CM

## 2019-09-09 PROCEDURE — 84153 ASSAY OF PSA TOTAL: CPT | Performed by: PHYSICIAN ASSISTANT

## 2019-09-09 PROCEDURE — 36415 COLL VENOUS BLD VENIPUNCTURE: CPT | Performed by: PHYSICIAN ASSISTANT

## 2019-09-10 LAB — PSA SERPL-MCNC: 0.47 UG/L (ref 0–4)

## 2019-09-24 DIAGNOSIS — N40.0 BPH (BENIGN PROSTATIC HYPERPLASIA): Primary | ICD-10-CM

## 2019-09-30 ENCOUNTER — HEALTH MAINTENANCE LETTER (OUTPATIENT)
Age: 69
End: 2019-09-30

## 2019-10-02 ENCOUNTER — OFFICE VISIT (OUTPATIENT)
Dept: UROLOGY | Facility: CLINIC | Age: 69
End: 2019-10-02
Payer: COMMERCIAL

## 2019-10-02 VITALS — HEART RATE: 56 BPM | OXYGEN SATURATION: 99 % | WEIGHT: 193 LBS | HEIGHT: 71 IN | BODY MASS INDEX: 27.02 KG/M2

## 2019-10-02 DIAGNOSIS — N40.0 BPH (BENIGN PROSTATIC HYPERPLASIA): ICD-10-CM

## 2019-10-02 DIAGNOSIS — R33.9 URINARY RETENTION: Primary | ICD-10-CM

## 2019-10-02 LAB — RESIDUAL VOLUME (RV) (EXTERNAL): 40

## 2019-10-02 PROCEDURE — 51798 US URINE CAPACITY MEASURE: CPT | Performed by: PHYSICIAN ASSISTANT

## 2019-10-02 PROCEDURE — 99213 OFFICE O/P EST LOW 20 MIN: CPT | Performed by: PHYSICIAN ASSISTANT

## 2019-10-02 ASSESSMENT — MIFFLIN-ST. JEOR: SCORE: 1662.57

## 2019-10-02 ASSESSMENT — PAIN SCALES - GENERAL: PAINLEVEL: NO PAIN (0)

## 2019-10-02 NOTE — LETTER
10/2/2019       RE: Tanner Pickett  [address]  [place] [address]     Dear Colleague,    Thank you for referring your patient, Tanner Pickett, to the [facility] UROLOGY CLINIC [place] at [facility]. Please see a copy of my visit note below.    CC: Urinary retention.    HPI: It is a pleasure to see . Tanner Pickett, a 69 year old male seen today in the urology clinic in follow up of urinary retention. He has done well since Estrada was removed. Here for PVR.    The patient has no prior history of AUR or similar symptoms. At baseline, patient does note some nocturia x 2-3, needing to double void, rare post-void dribbling. Currently denies fevers, chills, N/V, abdominal/back pain.    On Flomax, but having dizzy spells, nasal congestion.     PSA 0.47 9/9/19.    Past Medical History:   Diagnosis Date     Arrhythmia     Hx of Afib     Clavicle fracture      Constipation      Gout      High cholesterol      History of blood transfusion      Hypertension      Multiple rib fractures      Osteoarthritis     generalized     Pneumothorax      Shoulder fracture      Past Surgical History:   Procedure Laterality Date     ESOPHAGOSCOPY, GASTROSCOPY, DUODENOSCOPY (EGD), COMBINED  12/31/2015    Dr. Delarosa [facility]     ESOPHAGOSCOPY, GASTROSCOPY, DUODENOSCOPY (EGD), COMBINED N/A 12/31/2015    Procedure: COMBINED ESOPHAGOSCOPY, GASTROSCOPY, DUODENOSCOPY (EGD);  Surgeon: Jennifer Delarosa MD;  Location:  GI     ESOPHAGOSCOPY, GASTROSCOPY, DUODENOSCOPY (EGD), COMBINED N/A 1/2/2016    Procedure: COMBINED ESOPHAGOSCOPY, GASTROSCOPY, DUODENOSCOPY (EGD);  Surgeon: Loida Portillo MD;  Location:  GI     HEAD & NECK SURGERY      wisdom teeth     ORTHOPEDIC SURGERY      right shoulder surgery age 25 yrs.     ORTHOPEDIC SURGERY Left 2019    great toe surgery for arthritis     REVERSE ARTHROPLASTY SHOULDER Right 7/31/2019    Procedure: Right reverse total  shoulder arthroplasty.;  Surgeon: Per Yepez MD;  Location: [facility] OR     Current Outpatient Medications   Medication Sig Dispense Refill     acetaminophen (TYLENOL) 325 MG tablet Take 2 tablets (650 mg) by mouth every 4 hours as needed for other (mild pain) 60 tablet 0     apixaban ANTICOAGULANT (ELIQUIS) 5 MG tablet Take 5 mg by mouth 2 times daily Afib       chlorproMAZINE (THORAZINE) 10 MG tablet Take 1 tablet (10 mg) by mouth 3 times daily 3 tablet 0     diltiazem ER (DILT-XR) 120 MG 24 hr capsule Take 120 mg by mouth daily       gabapentin (NEURONTIN) 300 MG capsule Take 300 mg by mouth At Bedtime       hydrOXYzine (ATARAX) 25 MG tablet Take 1 tablet (25 mg) by mouth 3 times daily as needed (muscle spasms and breakthrough pain) 50 tablet 0     ibuprofen (ADVIL/MOTRIN) 600 MG tablet Take 1 tablet (600 mg) by mouth every 6 hours as needed for pain (mild) (Patient not taking: Reported on 8/7/2019) 30 tablet 0     ipratropium (ATROVENT) 0.03 % nasal spray Spray 1 spray into both nostrils 3 times daily        Loratadine (CLARITIN PO) Take 10 mg by mouth At Bedtime        metoprolol succinate ER (TOPROL-XL) 100 MG 24 hr tablet Take 100 mg by mouth 2 times daily       multivitamin, therapeutic with minerals (THERA-VIT-M) TABS Take 1 tablet by mouth daily       ondansetron (ZOFRAN-ODT) 4 MG ODT tab Take 1-2 tablets (4-8 mg) by mouth every 8 hours as needed for nausea Dissolve ON the tongue. 4 tablet 0     oxyCODONE (ROXICODONE) 5 MG tablet Take 1-2 tablets (5-10 mg) by mouth every 3 hours as needed for pain (Moderate to Severe) 50 tablet 0     psyllium 0.52 G capsule Take 8 capsules by mouth daily Dose confirmed with pt       ranitidine (ZANTAC) 150 MG capsule Take 150 mg by mouth 2 times daily       rosuvastatin (CRESTOR) 10 MG tablet Take 10 mg by mouth daily       senna-docusate (SENOKOT-S/PERICOLACE) 8.6-50 MG tablet Take 1-2 tablets by mouth 2 times daily Take while on oral narcotics to prevent or treat  "constipation. 50 tablet 0     tamsulosin (FLOMAX) 0.4 MG capsule Take 1 capsule (0.4 mg) by mouth daily 90 capsule 4     tamsulosin (FLOMAX) 0.4 MG capsule Take 1 capsule (0.4 mg) by mouth daily 30 capsule 0     No Known Allergies  Family History: There is no h/o  malignancy.  There is no h/o urolithiasis.     Social History     Socioeconomic History     Marital status:      Spouse name: Not on file     Number of children: Not on file     Years of education: Not on file     Highest education level: Not on file   Occupational History     Not on file   Social Needs     Financial resource strain: Not on file     Food insecurity:     Worry: Not on file     Inability: Not on file     Transportation needs:     Medical: Not on file     Non-medical: Not on file   Tobacco Use     Smoking status: Former Smoker     Smokeless tobacco: Never Used   Substance and Sexual Activity     Alcohol use: Yes     Comment: beer 3-4 times per week and rare glass of wine     Drug use: No     Sexual activity: Not Currently   Lifestyle     Physical activity:     Days per week: Not on file     Minutes per session: Not on file     Stress: Not on file   Relationships     Social connections:     Talks on phone: Not on file     Gets together: Not on file     Attends Congregational service: Not on file     Active member of club or organization: Not on file     Attends meetings of clubs or organizations: Not on file     Relationship status: Not on file     Intimate partner violence:     Fear of current or ex partner: Not on file     Emotionally abused: Not on file     Physically abused: Not on file     Forced sexual activity: Not on file   Other Topics Concern     Parent/sibling w/ CABG, MI or angioplasty before 65F 55M? Not Asked   Social History Narrative     Not on file       ROS: A comprehensive 14 point ROS was obtained and was  otherwise negative except for that outlined above in the HPI.    PHYSICAL EXAM:   Pulse 56   Ht 1.803 m (5' 11\")  "  Wt 87.5 kg (193 lb)   SpO2 99%   BMI 26.92 kg/m     PSYCH: NAD  EYES: EOMI  MOUTH: MMM  NECK: Supple, no notable adenopathy  RESP: Unlabored breathing  CARDIAC: No LE edema  SKIN: Warm, no rashes  ABD: soft, Nontender  NEURO: AAO x3    PVR 40cc    ASSESSMENT/PLAN:  69 year old male who developed acute urinary retention requiring Estrada catheter placement in the post op setting. Has been taking tamsulosin daily.   - Stop flomax due to SE and not much change in urinary habits.   -Call if issues, would need cysto as next step.  -Annual PSA and BEKAH with PCP.     Clemencia Morgan PA-C  [facility]    20 min spent with the patient, >50% of this time was spent in a face-to-face manner and on coordination of care of urinary retention.       Again, thank you for allowing me to participate in the care of your patient.      Sincerely,    Clemencia Morgan PA-C

## 2019-10-02 NOTE — PROGRESS NOTES
CC: Urinary retention.    HPI: It is a pleasure to see Mr. Tanner Pickett, a 69 year old male seen today in the urology clinic in follow up of urinary retention. He has done well since Estrada was removed. Here for PVR.    The patient has no prior history of AUR or similar symptoms. At baseline, patient does note some nocturia x 2-3, needing to double void, rare post-void dribbling. Currently denies fevers, chills, N/V, abdominal/back pain.    On Flomax, but having dizzy spells, nasal congestion.     PSA 0.47 9/9/19.    Past Medical History:   Diagnosis Date     Arrhythmia     Hx of Afib     Clavicle fracture      Constipation      Gout      High cholesterol      History of blood transfusion      Hypertension      Multiple rib fractures      Osteoarthritis     generalized     Pneumothorax      Shoulder fracture      Past Surgical History:   Procedure Laterality Date     ESOPHAGOSCOPY, GASTROSCOPY, DUODENOSCOPY (EGD), COMBINED  12/31/2015    Dr. Delarosa [facility]     ESOPHAGOSCOPY, GASTROSCOPY, DUODENOSCOPY (EGD), COMBINED N/A 12/31/2015    Procedure: COMBINED ESOPHAGOSCOPY, GASTROSCOPY, DUODENOSCOPY (EGD);  Surgeon: Jennifer Delarosa MD;  Location:  GI     ESOPHAGOSCOPY, GASTROSCOPY, DUODENOSCOPY (EGD), COMBINED N/A 1/2/2016    Procedure: COMBINED ESOPHAGOSCOPY, GASTROSCOPY, DUODENOSCOPY (EGD);  Surgeon: Loida Portillo MD;  Location:  GI     HEAD & NECK SURGERY      wisdom teeth     ORTHOPEDIC SURGERY      right shoulder surgery age 25 yrs.     ORTHOPEDIC SURGERY Left 2019    great toe surgery for arthritis     REVERSE ARTHROPLASTY SHOULDER Right 7/31/2019    Procedure: Right reverse total shoulder arthroplasty.;  Surgeon: Per Yepez MD;  Location:  OR     Current Outpatient Medications   Medication Sig Dispense Refill     acetaminophen (TYLENOL) 325 MG tablet Take 2 tablets (650 mg) by mouth every 4 hours as needed for other (mild pain) 60 tablet 0     apixaban ANTICOAGULANT (ELIQUIS) 5 MG  tablet Take 5 mg by mouth 2 times daily Afib       chlorproMAZINE (THORAZINE) 10 MG tablet Take 1 tablet (10 mg) by mouth 3 times daily 3 tablet 0     diltiazem ER (DILT-XR) 120 MG 24 hr capsule Take 120 mg by mouth daily       gabapentin (NEURONTIN) 300 MG capsule Take 300 mg by mouth At Bedtime       hydrOXYzine (ATARAX) 25 MG tablet Take 1 tablet (25 mg) by mouth 3 times daily as needed (muscle spasms and breakthrough pain) 50 tablet 0     ibuprofen (ADVIL/MOTRIN) 600 MG tablet Take 1 tablet (600 mg) by mouth every 6 hours as needed for pain (mild) (Patient not taking: Reported on 8/7/2019) 30 tablet 0     ipratropium (ATROVENT) 0.03 % nasal spray Spray 1 spray into both nostrils 3 times daily        Loratadine (CLARITIN PO) Take 10 mg by mouth At Bedtime        metoprolol succinate ER (TOPROL-XL) 100 MG 24 hr tablet Take 100 mg by mouth 2 times daily       multivitamin, therapeutic with minerals (THERA-VIT-M) TABS Take 1 tablet by mouth daily       ondansetron (ZOFRAN-ODT) 4 MG ODT tab Take 1-2 tablets (4-8 mg) by mouth every 8 hours as needed for nausea Dissolve ON the tongue. 4 tablet 0     oxyCODONE (ROXICODONE) 5 MG tablet Take 1-2 tablets (5-10 mg) by mouth every 3 hours as needed for pain (Moderate to Severe) 50 tablet 0     psyllium 0.52 G capsule Take 8 capsules by mouth daily Dose confirmed with pt       ranitidine (ZANTAC) 150 MG capsule Take 150 mg by mouth 2 times daily       rosuvastatin (CRESTOR) 10 MG tablet Take 10 mg by mouth daily       senna-docusate (SENOKOT-S/PERICOLACE) 8.6-50 MG tablet Take 1-2 tablets by mouth 2 times daily Take while on oral narcotics to prevent or treat constipation. 50 tablet 0     tamsulosin (FLOMAX) 0.4 MG capsule Take 1 capsule (0.4 mg) by mouth daily 90 capsule 4     tamsulosin (FLOMAX) 0.4 MG capsule Take 1 capsule (0.4 mg) by mouth daily 30 capsule 0     No Known Allergies  Family History: There is no h/o  malignancy.  There is no h/o urolithiasis.     Social  "History     Socioeconomic History     Marital status:      Spouse name: Not on file     Number of children: Not on file     Years of education: Not on file     Highest education level: Not on file   Occupational History     Not on file   Social Needs     Financial resource strain: Not on file     Food insecurity:     Worry: Not on file     Inability: Not on file     Transportation needs:     Medical: Not on file     Non-medical: Not on file   Tobacco Use     Smoking status: Former Smoker     Smokeless tobacco: Never Used   Substance and Sexual Activity     Alcohol use: Yes     Comment: beer 3-4 times per week and rare glass of wine     Drug use: No     Sexual activity: Not Currently   Lifestyle     Physical activity:     Days per week: Not on file     Minutes per session: Not on file     Stress: Not on file   Relationships     Social connections:     Talks on phone: Not on file     Gets together: Not on file     Attends Taoism service: Not on file     Active member of club or organization: Not on file     Attends meetings of clubs or organizations: Not on file     Relationship status: Not on file     Intimate partner violence:     Fear of current or ex partner: Not on file     Emotionally abused: Not on file     Physically abused: Not on file     Forced sexual activity: Not on file   Other Topics Concern     Parent/sibling w/ CABG, MI or angioplasty before 65F 55M? Not Asked   Social History Narrative     Not on file       ROS: A comprehensive 14 point ROS was obtained and was  otherwise negative except for that outlined above in the HPI.    PHYSICAL EXAM:   Pulse 56   Ht 1.803 m (5' 11\")   Wt 87.5 kg (193 lb)   SpO2 99%   BMI 26.92 kg/m    PSYCH: NAD  EYES: EOMI  MOUTH: MMM  NECK: Supple, no notable adenopathy  RESP: Unlabored breathing  CARDIAC: No LE edema  SKIN: Warm, no rashes  ABD: soft, Nontender  NEURO: AAO x3    PVR 40cc    ASSESSMENT/PLAN:  69 year old male who developed acute urinary " retention requiring Estrada catheter placement in the post op setting. Has been taking tamsulosin daily.   - Stop flomax due to SE and not much change in urinary habits.   -Call if issues, would need cysto as next step.  -Annual PSA and BEKAH with PCP.     AGGIE Morgan[facility]    20 min spent with the patient, >50% of this time was spent in a face-to-face manner and on coordination of care of urinary retention.

## 2020-03-15 ENCOUNTER — HEALTH MAINTENANCE LETTER (OUTPATIENT)
Age: 70
End: 2020-03-15

## 2021-01-15 ENCOUNTER — HEALTH MAINTENANCE LETTER (OUTPATIENT)
Age: 71
End: 2021-01-15

## 2021-05-09 ENCOUNTER — HEALTH MAINTENANCE LETTER (OUTPATIENT)
Age: 71
End: 2021-05-09

## 2021-10-24 ENCOUNTER — HEALTH MAINTENANCE LETTER (OUTPATIENT)
Age: 71
End: 2021-10-24

## 2022-06-05 ENCOUNTER — HEALTH MAINTENANCE LETTER (OUTPATIENT)
Age: 72
End: 2022-06-05

## 2022-10-15 ENCOUNTER — HEALTH MAINTENANCE LETTER (OUTPATIENT)
Age: 72
End: 2022-10-15

## 2023-06-11 ENCOUNTER — HEALTH MAINTENANCE LETTER (OUTPATIENT)
Age: 73
End: 2023-06-11

## (undated) DEVICE — PACK SHOULDER RIDGES

## (undated) DEVICE — LINEN HALF SHEET 5512

## (undated) DEVICE — GLOVE PROTEXIS POWDER FREE 7.5 ORTHOPEDIC 2D73ET75

## (undated) DEVICE — DRILL WITH STOP

## (undated) DEVICE — Device

## (undated) DEVICE — DRSG AQUACEL AG 3.5X9.75" HYDROFIBER 412011

## (undated) DEVICE — BLADE KNIFE SURG 10 371110

## (undated) DEVICE — LINEN ORTHO ACL PACK 5447

## (undated) DEVICE — PACK SET-UP STD 9102

## (undated) DEVICE — ESU PENCIL W/HOLSTER E2350H

## (undated) DEVICE — SPONGE LAP 18X18" X8435

## (undated) DEVICE — IMM SHOULDER  ABDUCT ULTRASLING III LG 11-0449-4

## (undated) DEVICE — SU FIBERWIRE 5 CCS-1 BLUE  AR-7211

## (undated) DEVICE — SU MONOCRYL 4-0 PS-2 18" UND Y496G

## (undated) DEVICE — GLOVE PROTEXIS BLUE W/NEU-THERA 8.0  2D73EB80

## (undated) DEVICE — PREP CHLORAPREP 26ML TINTED ORANGE  260815

## (undated) DEVICE — ESU GROUND PAD ADULT W/CORD E7507

## (undated) DEVICE — SU FIBERWIRE 2 38"  AR-7200

## (undated) DEVICE — SU VICRYL 1 CT-1 27" J341H

## (undated) DEVICE — 2.7MM DRILL BIT

## (undated) DEVICE — DRAPE POUCH IRR 1016

## (undated) DEVICE — BAG CLEAR TRASH 1.3M 39X33" P4040C

## (undated) DEVICE — GLOVE PROTEXIS POWDER FREE 8.0 ORTHOPEDIC 2D73ET80

## (undated) DEVICE — SUCTION TIP YANKAUER W/O VENT K86

## (undated) DEVICE — SU ETHIBOND 2 V-37 4X30" MX69G

## (undated) DEVICE — GLOVE PROTEXIS BLUE W/NEU-THERA 7.5  2D73EB75

## (undated) DEVICE — BLADE SAW SAGITTAL STRK 20.7X85X0.89MM 2108-109-000S13

## (undated) DEVICE — SYR BULB IRRIG 50ML LATEX FREE 0035280

## (undated) DEVICE — BNDG ELASTIC 4"X5YDS UNSTERILE 6611-40

## (undated) DEVICE — SU NDL MAYO 1824-6

## (undated) DEVICE — SU VICRYL 2-0 CT-2 27" UND J269H

## (undated) DEVICE — DRAPE CONVERTORS U-DRAPE 60X72" 8476

## (undated) DEVICE — SUCTION MANIFOLD NEPTUNE 2 SYS 4 PORT 0702-020-000

## (undated) DEVICE — 3.2MM DRILL BIT

## (undated) DEVICE — LINEN FULL SHEET 5511

## (undated) DEVICE — DRSG STERI STRIP 1/2X4" R1547

## (undated) DEVICE — STPL SKIN 35W 6.9MM  PXW35

## (undated) DEVICE — SET HANDPIECE INTERPULSE W/COAXIAL FAN SPRAY TIP 0210118000

## (undated) DEVICE — IMM SHOULDER LG 79-84017

## (undated) DEVICE — NDL BLUNT 18GA 1" W/O FILTER 305181

## (undated) DEVICE — DRAPE SHOULDER PACK SPLITS 29365

## (undated) DEVICE — DRAPE IOBAN INCISE 23X17" 6650EZ

## (undated) DEVICE — SU VICRYL 0 CT-1 36" J346H

## (undated) RX ORDER — FENTANYL CITRATE 50 UG/ML
INJECTION, SOLUTION INTRAMUSCULAR; INTRAVENOUS
Status: DISPENSED
Start: 2019-07-31

## (undated) RX ORDER — CEFAZOLIN SODIUM 1 G/3ML
INJECTION, POWDER, FOR SOLUTION INTRAMUSCULAR; INTRAVENOUS
Status: DISPENSED
Start: 2019-07-31

## (undated) RX ORDER — ONDANSETRON 2 MG/ML
INJECTION INTRAMUSCULAR; INTRAVENOUS
Status: DISPENSED
Start: 2019-07-31

## (undated) RX ORDER — CEFAZOLIN SODIUM 2 G/100ML
INJECTION, SOLUTION INTRAVENOUS
Status: DISPENSED
Start: 2019-07-31